# Patient Record
Sex: MALE | Race: WHITE | Employment: OTHER | URBAN - METROPOLITAN AREA
[De-identification: names, ages, dates, MRNs, and addresses within clinical notes are randomized per-mention and may not be internally consistent; named-entity substitution may affect disease eponyms.]

---

## 2019-02-06 ENCOUNTER — HOSPITAL ENCOUNTER (INPATIENT)
Age: 64
LOS: 1 days | Discharge: HOME | DRG: 392 | End: 2019-02-07
Attending: EMERGENCY MEDICINE | Admitting: HOSPITALIST
Payer: MEDICARE

## 2019-02-06 ENCOUNTER — APPOINTMENT (OUTPATIENT)
Dept: CT IMAGING | Age: 64
DRG: 392 | End: 2019-02-06
Payer: MEDICARE

## 2019-02-06 DIAGNOSIS — R10.84 GENERALIZED ABDOMINAL PAIN: Primary | ICD-10-CM

## 2019-02-06 DIAGNOSIS — S30.1XXA ABDOMINAL WALL SEROMA, INITIAL ENCOUNTER: ICD-10-CM

## 2019-02-06 DIAGNOSIS — K56.600 PARTIAL INTESTINAL OBSTRUCTION, UNSPECIFIED CAUSE (HCC): ICD-10-CM

## 2019-02-06 DIAGNOSIS — R11.2 NAUSEA AND VOMITING, INTRACTABILITY OF VOMITING NOT SPECIFIED, UNSPECIFIED VOMITING TYPE: ICD-10-CM

## 2019-02-06 DIAGNOSIS — R18.8 ABDOMINAL FLUID COLLECTION: ICD-10-CM

## 2019-02-06 LAB
ALBUMIN SERPL-MCNC: 4.2 GM/DL (ref 3.4–5)
ALP BLD-CCNC: 95 IU/L (ref 40–129)
ALT SERPL-CCNC: 62 U/L (ref 10–40)
ANION GAP SERPL CALCULATED.3IONS-SCNC: 12 MMOL/L (ref 4–16)
AST SERPL-CCNC: 48 IU/L (ref 15–37)
BACTERIA: NEGATIVE /HPF
BASOPHILS ABSOLUTE: 0.1 K/CU MM
BASOPHILS RELATIVE PERCENT: 1.2 % (ref 0–1)
BILIRUB SERPL-MCNC: 0.4 MG/DL (ref 0–1)
BILIRUBIN URINE: NEGATIVE MG/DL
BLOOD, URINE: NEGATIVE
BUN BLDV-MCNC: 16 MG/DL (ref 6–23)
CALCIUM SERPL-MCNC: 9.9 MG/DL (ref 8.3–10.6)
CHLORIDE BLD-SCNC: 99 MMOL/L (ref 99–110)
CLARITY: CLEAR
CO2: 29 MMOL/L (ref 21–32)
COLOR: YELLOW
CREAT SERPL-MCNC: 0.7 MG/DL (ref 0.9–1.3)
DIFFERENTIAL TYPE: ABNORMAL
EOSINOPHILS ABSOLUTE: 0.1 K/CU MM
EOSINOPHILS RELATIVE PERCENT: 2.1 % (ref 0–3)
GFR AFRICAN AMERICAN: >60 ML/MIN/1.73M2
GFR NON-AFRICAN AMERICAN: >60 ML/MIN/1.73M2
GLUCOSE BLD-MCNC: 163 MG/DL (ref 70–99)
GLUCOSE, URINE: 50 MG/DL
HCT VFR BLD CALC: 44.6 % (ref 42–52)
HEMOGLOBIN: 14.8 GM/DL (ref 13.5–18)
IMMATURE NEUTROPHIL %: 0.1 % (ref 0–0.43)
KETONES, URINE: ABNORMAL MG/DL
LEUKOCYTE ESTERASE, URINE: NEGATIVE
LIPASE: 21 IU/L (ref 13–60)
LYMPHOCYTES ABSOLUTE: 1.4 K/CU MM
LYMPHOCYTES RELATIVE PERCENT: 21.1 % (ref 24–44)
MCH RBC QN AUTO: 29.6 PG (ref 27–31)
MCHC RBC AUTO-ENTMCNC: 33.2 % (ref 32–36)
MCV RBC AUTO: 89.2 FL (ref 78–100)
MONOCYTES ABSOLUTE: 0.9 K/CU MM
MONOCYTES RELATIVE PERCENT: 12.8 % (ref 0–4)
MUCUS: ABNORMAL HPF
NITRITE URINE, QUANTITATIVE: NEGATIVE
NUCLEATED RBC %: 0 %
PDW BLD-RTO: 12.2 % (ref 11.7–14.9)
PH, URINE: 7 (ref 5–8)
PLATELET # BLD: 191 K/CU MM (ref 140–440)
PMV BLD AUTO: 9.2 FL (ref 7.5–11.1)
POTASSIUM SERPL-SCNC: 4.4 MMOL/L (ref 3.5–5.1)
PROTEIN UA: >500 MG/DL
RBC # BLD: 5 M/CU MM (ref 4.6–6.2)
RBC URINE: 2 /HPF (ref 0–3)
SEGMENTED NEUTROPHILS ABSOLUTE COUNT: 4.3 K/CU MM
SEGMENTED NEUTROPHILS RELATIVE PERCENT: 62.7 % (ref 36–66)
SODIUM BLD-SCNC: 140 MMOL/L (ref 135–145)
SPECIFIC GRAVITY UA: 1.02 (ref 1–1.03)
SQUAMOUS EPITHELIAL: <1 /HPF
TOTAL IMMATURE NEUTOROPHIL: 0.01 K/CU MM
TOTAL NUCLEATED RBC: 0 K/CU MM
TOTAL PROTEIN: 7.4 GM/DL (ref 6.4–8.2)
TRICHOMONAS: ABNORMAL /HPF
TROPONIN T: <0.01 NG/ML
UROBILINOGEN, URINE: NORMAL MG/DL (ref 0.2–1)
WBC # BLD: 6.8 K/CU MM (ref 4–10.5)
WBC UA: 1 /HPF (ref 0–2)

## 2019-02-06 PROCEDURE — 80053 COMPREHEN METABOLIC PANEL: CPT

## 2019-02-06 PROCEDURE — 74177 CT ABD & PELVIS W/CONTRAST: CPT

## 2019-02-06 PROCEDURE — 83690 ASSAY OF LIPASE: CPT

## 2019-02-06 PROCEDURE — 85025 COMPLETE CBC W/AUTO DIFF WBC: CPT

## 2019-02-06 PROCEDURE — 93005 ELECTROCARDIOGRAM TRACING: CPT | Performed by: EMERGENCY MEDICINE

## 2019-02-06 PROCEDURE — 81001 URINALYSIS AUTO W/SCOPE: CPT

## 2019-02-06 PROCEDURE — 84484 ASSAY OF TROPONIN QUANT: CPT

## 2019-02-06 PROCEDURE — 99285 EMERGENCY DEPT VISIT HI MDM: CPT

## 2019-02-06 PROCEDURE — 6360000004 HC RX CONTRAST MEDICATION: Performed by: EMERGENCY MEDICINE

## 2019-02-06 RX ORDER — ONDANSETRON 2 MG/ML
4 INJECTION INTRAMUSCULAR; INTRAVENOUS EVERY 30 MIN PRN
Status: DISCONTINUED | OUTPATIENT
Start: 2019-02-06 | End: 2019-02-07

## 2019-02-06 RX ADMIN — IOPAMIDOL 80 ML: 755 INJECTION, SOLUTION INTRAVENOUS at 23:47

## 2019-02-06 ASSESSMENT — PAIN SCALES - GENERAL: PAINLEVEL_OUTOF10: 10

## 2019-02-06 ASSESSMENT — PAIN DESCRIPTION - DESCRIPTORS: DESCRIPTORS: CONSTANT

## 2019-02-06 ASSESSMENT — PAIN DESCRIPTION - PAIN TYPE: TYPE: ACUTE PAIN

## 2019-02-06 ASSESSMENT — PAIN DESCRIPTION - LOCATION: LOCATION: ABDOMEN

## 2019-02-07 ENCOUNTER — APPOINTMENT (OUTPATIENT)
Dept: INTERVENTIONAL RADIOLOGY/VASCULAR | Age: 64
DRG: 392 | End: 2019-02-07
Payer: MEDICARE

## 2019-02-07 VITALS
RESPIRATION RATE: 16 BRPM | TEMPERATURE: 98.4 F | OXYGEN SATURATION: 93 % | HEIGHT: 73 IN | DIASTOLIC BLOOD PRESSURE: 90 MMHG | WEIGHT: 300 LBS | BODY MASS INDEX: 39.76 KG/M2 | HEART RATE: 74 BPM | SYSTOLIC BLOOD PRESSURE: 129 MMHG

## 2019-02-07 PROBLEM — K56.609 BOWEL OBSTRUCTION (HCC): Status: ACTIVE | Noted: 2019-02-07

## 2019-02-07 PROBLEM — K52.9 GASTROENTERITIS: Status: ACTIVE | Noted: 2019-02-07

## 2019-02-07 PROCEDURE — 6370000000 HC RX 637 (ALT 250 FOR IP): Performed by: HOSPITALIST

## 2019-02-07 PROCEDURE — 2500000003 HC RX 250 WO HCPCS: Performed by: HOSPITALIST

## 2019-02-07 PROCEDURE — 6360000002 HC RX W HCPCS: Performed by: EMERGENCY MEDICINE

## 2019-02-07 PROCEDURE — 0W9F30Z DRAINAGE OF ABDOMINAL WALL WITH DRAINAGE DEVICE, PERCUTANEOUS APPROACH: ICD-10-PCS | Performed by: RADIOLOGY

## 2019-02-07 PROCEDURE — 2709999900 HC NON-CHARGEABLE SUPPLY

## 2019-02-07 PROCEDURE — C1729 CATH, DRAINAGE: HCPCS

## 2019-02-07 PROCEDURE — 6360000002 HC RX W HCPCS: Performed by: HOSPITALIST

## 2019-02-07 PROCEDURE — 96365 THER/PROPH/DIAG IV INF INIT: CPT

## 2019-02-07 PROCEDURE — 93010 ELECTROCARDIOGRAM REPORT: CPT | Performed by: INTERNAL MEDICINE

## 2019-02-07 PROCEDURE — 2580000003 HC RX 258: Performed by: HOSPITALIST

## 2019-02-07 PROCEDURE — 0W9G3ZX DRAINAGE OF PERITONEAL CAVITY, PERCUTANEOUS APPROACH, DIAGNOSTIC: ICD-10-PCS | Performed by: RADIOLOGY

## 2019-02-07 PROCEDURE — 87071 CULTURE AEROBIC QUANT OTHER: CPT

## 2019-02-07 PROCEDURE — 1200000000 HC SEMI PRIVATE

## 2019-02-07 PROCEDURE — 94761 N-INVAS EAR/PLS OXIMETRY MLT: CPT

## 2019-02-07 PROCEDURE — 10030 IMG GID FLU COLL DRG SFT TIS: CPT

## 2019-02-07 PROCEDURE — 96375 TX/PRO/DX INJ NEW DRUG ADDON: CPT

## 2019-02-07 PROCEDURE — C1894 INTRO/SHEATH, NON-LASER: HCPCS

## 2019-02-07 PROCEDURE — 94640 AIRWAY INHALATION TREATMENT: CPT

## 2019-02-07 PROCEDURE — 87073 CULTURE BACTERIA ANAEROBIC: CPT

## 2019-02-07 PROCEDURE — C1769 GUIDE WIRE: HCPCS

## 2019-02-07 RX ORDER — ONDANSETRON 2 MG/ML
4 INJECTION INTRAMUSCULAR; INTRAVENOUS EVERY 6 HOURS PRN
Status: DISCONTINUED | OUTPATIENT
Start: 2019-02-07 | End: 2019-02-07

## 2019-02-07 RX ORDER — HYDROCHLOROTHIAZIDE 25 MG/1
25 TABLET ORAL DAILY
Status: DISCONTINUED | OUTPATIENT
Start: 2019-02-07 | End: 2019-02-07 | Stop reason: HOSPADM

## 2019-02-07 RX ORDER — SODIUM CHLORIDE 0.9 % (FLUSH) 0.9 %
10 SYRINGE (ML) INJECTION EVERY 12 HOURS SCHEDULED
Status: DISCONTINUED | OUTPATIENT
Start: 2019-02-07 | End: 2019-02-07 | Stop reason: HOSPADM

## 2019-02-07 RX ORDER — SODIUM CHLORIDE 9 MG/ML
INJECTION, SOLUTION INTRAVENOUS CONTINUOUS
Status: DISCONTINUED | OUTPATIENT
Start: 2019-02-07 | End: 2019-02-07 | Stop reason: HOSPADM

## 2019-02-07 RX ORDER — ASPIRIN 81 MG/1
81 TABLET, CHEWABLE ORAL DAILY
Status: DISCONTINUED | OUTPATIENT
Start: 2019-02-07 | End: 2019-02-07 | Stop reason: HOSPADM

## 2019-02-07 RX ORDER — MORPHINE SULFATE 4 MG/ML
2 INJECTION, SOLUTION INTRAMUSCULAR; INTRAVENOUS
Status: DISCONTINUED | OUTPATIENT
Start: 2019-02-07 | End: 2019-02-07 | Stop reason: HOSPADM

## 2019-02-07 RX ORDER — AMITRIPTYLINE HYDROCHLORIDE 25 MG/1
25 TABLET, FILM COATED ORAL NIGHTLY
Status: DISCONTINUED | OUTPATIENT
Start: 2019-02-07 | End: 2019-02-07 | Stop reason: HOSPADM

## 2019-02-07 RX ORDER — AMLODIPINE BESYLATE 10 MG/1
10 TABLET ORAL DAILY
Status: DISCONTINUED | OUTPATIENT
Start: 2019-02-07 | End: 2019-02-07 | Stop reason: HOSPADM

## 2019-02-07 RX ORDER — OXYCODONE HYDROCHLORIDE AND ACETAMINOPHEN 5; 325 MG/1; MG/1
1 TABLET ORAL EVERY 4 HOURS PRN
Status: DISCONTINUED | OUTPATIENT
Start: 2019-02-07 | End: 2019-02-07 | Stop reason: HOSPADM

## 2019-02-07 RX ORDER — SODIUM CHLORIDE 0.9 % (FLUSH) 0.9 %
10 SYRINGE (ML) INJECTION PRN
Status: DISCONTINUED | OUTPATIENT
Start: 2019-02-07 | End: 2019-02-07 | Stop reason: HOSPADM

## 2019-02-07 RX ORDER — MORPHINE SULFATE 4 MG/ML
6 INJECTION, SOLUTION INTRAMUSCULAR; INTRAVENOUS ONCE
Status: COMPLETED | OUTPATIENT
Start: 2019-02-07 | End: 2019-02-07

## 2019-02-07 RX ORDER — PRAVASTATIN SODIUM 40 MG
40 TABLET ORAL DAILY
Status: DISCONTINUED | OUTPATIENT
Start: 2019-02-07 | End: 2019-02-07 | Stop reason: HOSPADM

## 2019-02-07 RX ORDER — ONDANSETRON 2 MG/ML
4 INJECTION INTRAMUSCULAR; INTRAVENOUS EVERY 6 HOURS PRN
Status: DISCONTINUED | OUTPATIENT
Start: 2019-02-07 | End: 2019-02-07 | Stop reason: HOSPADM

## 2019-02-07 RX ADMIN — AMLODIPINE BESYLATE 10 MG: 10 TABLET ORAL at 11:37

## 2019-02-07 RX ADMIN — PRAVASTATIN SODIUM 40 MG: 40 TABLET ORAL at 11:37

## 2019-02-07 RX ADMIN — MORPHINE SULFATE 2 MG: 4 INJECTION INTRAVENOUS at 07:11

## 2019-02-07 RX ADMIN — MORPHINE SULFATE 2 MG: 4 INJECTION INTRAVENOUS at 02:50

## 2019-02-07 RX ADMIN — MORPHINE SULFATE 6 MG: 4 INJECTION INTRAVENOUS at 01:04

## 2019-02-07 RX ADMIN — GLYCOPYRROLATE AND FORMOTEROL FUMARATE 2 PUFF: 9; 4.8 AEROSOL, METERED RESPIRATORY (INHALATION) at 07:46

## 2019-02-07 RX ADMIN — TAZOBACTAM SODIUM AND PIPERACILLIN SODIUM 3.38 G: 375; 3 INJECTION, SOLUTION INTRAVENOUS at 01:04

## 2019-02-07 RX ADMIN — TAZOBACTAM SODIUM AND PIPERACILLIN SODIUM 3.38 G: 375; 3 INJECTION, SOLUTION INTRAVENOUS at 07:11

## 2019-02-07 RX ADMIN — HYDROCHLOROTHIAZIDE 25 MG: 25 TABLET ORAL at 11:37

## 2019-02-07 RX ADMIN — SODIUM CHLORIDE: 900 INJECTION INTRAVENOUS at 02:51

## 2019-02-07 RX ADMIN — ONDANSETRON 4 MG: 2 INJECTION INTRAMUSCULAR; INTRAVENOUS at 01:04

## 2019-02-07 ASSESSMENT — PAIN SCALES - GENERAL
PAINLEVEL_OUTOF10: 7

## 2019-02-11 LAB
CULTURE: NORMAL
Lab: NORMAL
REPORT STATUS: NORMAL
SPECIMEN: NORMAL

## 2019-02-19 LAB
EKG ATRIAL RATE: 77 BPM
EKG DIAGNOSIS: NORMAL
EKG P AXIS: 16 DEGREES
EKG P-R INTERVAL: 170 MS
EKG Q-T INTERVAL: 402 MS
EKG QRS DURATION: 112 MS
EKG QTC CALCULATION (BAZETT): 454 MS
EKG R AXIS: -38 DEGREES
EKG T AXIS: 34 DEGREES
EKG VENTRICULAR RATE: 77 BPM

## 2019-12-26 ENCOUNTER — HOSPITAL ENCOUNTER (OUTPATIENT)
Age: 64
Discharge: HOME | End: 2019-12-26
Payer: MEDICARE

## 2019-12-26 LAB
ANION GAP SERPL CALCULATED.3IONS-SCNC: 12 MMOL/L (ref 4–16)
APTT: 29 SECONDS (ref 25.1–37.1)
BASOPHILS ABSOLUTE: 0.1 K/CU MM
BASOPHILS RELATIVE PERCENT: 1.1 % (ref 0–1)
BUN BLDV-MCNC: 17 MG/DL (ref 6–23)
CALCIUM SERPL-MCNC: 9.9 MG/DL (ref 8.3–10.6)
CHLORIDE BLD-SCNC: 96 MMOL/L (ref 99–110)
CO2: 32 MMOL/L (ref 21–32)
CREAT SERPL-MCNC: 0.7 MG/DL (ref 0.9–1.3)
DIFFERENTIAL TYPE: ABNORMAL
EOSINOPHILS ABSOLUTE: 0.2 K/CU MM
EOSINOPHILS RELATIVE PERCENT: 2.3 % (ref 0–3)
GFR AFRICAN AMERICAN: >60 ML/MIN/1.73M2
GFR NON-AFRICAN AMERICAN: >60 ML/MIN/1.73M2
GLUCOSE BLD-MCNC: 130 MG/DL (ref 70–99)
HCT VFR BLD CALC: 50.8 % (ref 42–52)
HEMOGLOBIN: 16.4 GM/DL (ref 13.5–18)
IMMATURE NEUTROPHIL %: 0.8 % (ref 0–0.43)
INR BLD: 0.89 INDEX
LYMPHOCYTES ABSOLUTE: 1.5 K/CU MM
LYMPHOCYTES RELATIVE PERCENT: 20.6 % (ref 24–44)
MCH RBC QN AUTO: 29.4 PG (ref 27–31)
MCHC RBC AUTO-ENTMCNC: 32.3 % (ref 32–36)
MCV RBC AUTO: 91.2 FL (ref 78–100)
MONOCYTES ABSOLUTE: 0.9 K/CU MM
MONOCYTES RELATIVE PERCENT: 12.1 % (ref 0–4)
NUCLEATED RBC %: 0 %
PDW BLD-RTO: 12.2 % (ref 11.7–14.9)
PLATELET # BLD: 197 K/CU MM (ref 140–440)
PMV BLD AUTO: 9.4 FL (ref 7.5–11.1)
POTASSIUM SERPL-SCNC: 4.5 MMOL/L (ref 3.5–5.1)
PROTHROMBIN TIME: 10.8 SECONDS (ref 11.7–14.5)
RBC # BLD: 5.57 M/CU MM (ref 4.6–6.2)
SEGMENTED NEUTROPHILS ABSOLUTE COUNT: 4.7 K/CU MM
SEGMENTED NEUTROPHILS RELATIVE PERCENT: 63.1 % (ref 36–66)
SODIUM BLD-SCNC: 140 MMOL/L (ref 135–145)
TOTAL IMMATURE NEUTOROPHIL: 0.06 K/CU MM
TOTAL NUCLEATED RBC: 0 K/CU MM
WBC # BLD: 7.4 K/CU MM (ref 4–10.5)

## 2019-12-26 PROCEDURE — 85730 THROMBOPLASTIN TIME PARTIAL: CPT

## 2019-12-26 PROCEDURE — 36415 COLL VENOUS BLD VENIPUNCTURE: CPT

## 2019-12-26 PROCEDURE — 83036 HEMOGLOBIN GLYCOSYLATED A1C: CPT

## 2019-12-26 PROCEDURE — 85025 COMPLETE CBC W/AUTO DIFF WBC: CPT

## 2019-12-26 PROCEDURE — 85610 PROTHROMBIN TIME: CPT

## 2019-12-26 PROCEDURE — 80048 BASIC METABOLIC PNL TOTAL CA: CPT

## 2019-12-31 ENCOUNTER — HOSPITAL ENCOUNTER (OUTPATIENT)
Dept: CARDIAC CATH/INVASIVE PROCEDURES | Age: 64
Setting detail: OBSERVATION
Discharge: HOME | DRG: 247 | End: 2020-01-01
Attending: INTERNAL MEDICINE | Admitting: INTERNAL MEDICINE
Payer: MEDICARE

## 2019-12-31 PROBLEM — I25.10 CAD S/P PERCUTANEOUS CORONARY ANGIOPLASTY: Status: ACTIVE | Noted: 2019-12-31

## 2019-12-31 PROBLEM — I25.10 CORONARY ARTERIOSCLEROSIS AFTER PERCUTANEOUS TRANSLUMINAL CORONARY ANGIOPLASTY (PTCA): Status: ACTIVE | Noted: 2019-12-31

## 2019-12-31 PROBLEM — Z98.61 CAD S/P PERCUTANEOUS CORONARY ANGIOPLASTY: Status: ACTIVE | Noted: 2019-12-31

## 2019-12-31 PROBLEM — Z98.61 CORONARY ARTERIOSCLEROSIS AFTER PERCUTANEOUS TRANSLUMINAL CORONARY ANGIOPLASTY (PTCA): Status: ACTIVE | Noted: 2019-12-31

## 2019-12-31 LAB
ACTIVATED CLOTTING TIME, LOW RANGE: 155 SEC
ACTIVATED CLOTTING TIME, LOW RANGE: 241 SEC
ACTIVATED CLOTTING TIME, LOW RANGE: 349 SEC
BASE EXCESS MIXED: ABNORMAL (ref 0–1.2)
CARBON MONOXIDE, BLOOD: 2.2 % (ref 0–5)
CO2 CONTENT: 31 MMOL/L (ref 19–24)
COMMENT: ABNORMAL
ESTIMATED AVERAGE GLUCOSE: 169 MG/DL
GLUCOSE BLD-MCNC: 121 MG/DL (ref 70–99)
GLUCOSE BLD-MCNC: 124 MG/DL (ref 70–99)
GLUCOSE BLD-MCNC: 146 MG/DL (ref 70–99)
GLUCOSE BLD-MCNC: 157 MG/DL (ref 70–99)
HBA1C MFR BLD: 7.5 % (ref 4.2–6.3)
HCO3 ARTERIAL: 29.4 MMOL/L (ref 18–23)
METHEMOGLOBIN ARTERIAL: 1.3 %
O2 SATURATION: 85.6 % (ref 96–97)
PCO2 ARTERIAL: 52 MMHG (ref 32–45)
PH BLOOD: 7.36 (ref 7.34–7.45)
PO2 ARTERIAL: 50 MMHG (ref 75–100)

## 2019-12-31 PROCEDURE — 85347 COAGULATION TIME ACTIVATED: CPT

## 2019-12-31 PROCEDURE — G0378 HOSPITAL OBSERVATION PER HR: HCPCS

## 2019-12-31 PROCEDURE — 6360000002 HC RX W HCPCS

## 2019-12-31 PROCEDURE — 93460 R&L HRT ART/VENTRICLE ANGIO: CPT

## 2019-12-31 PROCEDURE — 6370000000 HC RX 637 (ALT 250 FOR IP): Performed by: INTERNAL MEDICINE

## 2019-12-31 PROCEDURE — C1887 CATHETER, GUIDING: HCPCS

## 2019-12-31 PROCEDURE — B2111ZZ FLUOROSCOPY OF MULTIPLE CORONARY ARTERIES USING LOW OSMOLAR CONTRAST: ICD-10-PCS | Performed by: INTERNAL MEDICINE

## 2019-12-31 PROCEDURE — 82803 BLOOD GASES ANY COMBINATION: CPT

## 2019-12-31 PROCEDURE — C1769 GUIDE WIRE: HCPCS

## 2019-12-31 PROCEDURE — 4A023N8 MEASUREMENT OF CARDIAC SAMPLING AND PRESSURE, BILATERAL, PERCUTANEOUS APPROACH: ICD-10-PCS | Performed by: INTERNAL MEDICINE

## 2019-12-31 PROCEDURE — 2580000003 HC RX 258: Performed by: INTERNAL MEDICINE

## 2019-12-31 PROCEDURE — 2580000003 HC RX 258

## 2019-12-31 PROCEDURE — C1751 CATH, INF, PER/CENT/MIDLINE: HCPCS

## 2019-12-31 PROCEDURE — 027034Z DILATION OF CORONARY ARTERY, ONE ARTERY WITH DRUG-ELUTING INTRALUMINAL DEVICE, PERCUTANEOUS APPROACH: ICD-10-PCS | Performed by: INTERNAL MEDICINE

## 2019-12-31 PROCEDURE — 2709999900 HC NON-CHARGEABLE SUPPLY

## 2019-12-31 PROCEDURE — C9600 PERC DRUG-EL COR STENT SING: HCPCS

## 2019-12-31 PROCEDURE — 6370000000 HC RX 637 (ALT 250 FOR IP)

## 2019-12-31 PROCEDURE — 2500000003 HC RX 250 WO HCPCS

## 2019-12-31 PROCEDURE — 76937 US GUIDE VASCULAR ACCESS: CPT

## 2019-12-31 PROCEDURE — 2140000000 HC CCU INTERMEDIATE R&B

## 2019-12-31 PROCEDURE — C1725 CATH, TRANSLUMIN NON-LASER: HCPCS

## 2019-12-31 PROCEDURE — C1874 STENT, COATED/COV W/DEL SYS: HCPCS

## 2019-12-31 PROCEDURE — 6360000004 HC RX CONTRAST MEDICATION

## 2019-12-31 PROCEDURE — 82962 GLUCOSE BLOOD TEST: CPT

## 2019-12-31 PROCEDURE — C1894 INTRO/SHEATH, NON-LASER: HCPCS

## 2019-12-31 RX ORDER — SODIUM CHLORIDE 9 MG/ML
INJECTION, SOLUTION INTRAVENOUS CONTINUOUS
Status: DISCONTINUED | OUTPATIENT
Start: 2019-12-31 | End: 2019-12-31

## 2019-12-31 RX ORDER — FUROSEMIDE 40 MG/1
40 TABLET ORAL 2 TIMES DAILY
COMMUNITY

## 2019-12-31 RX ORDER — ALBUTEROL SULFATE 90 UG/1
2 AEROSOL, METERED RESPIRATORY (INHALATION) EVERY 6 HOURS PRN
Status: DISCONTINUED | OUTPATIENT
Start: 2019-12-31 | End: 2020-01-01 | Stop reason: HOSPADM

## 2019-12-31 RX ORDER — DOXAZOSIN MESYLATE 1 MG/1
2 TABLET ORAL DAILY
Status: DISCONTINUED | OUTPATIENT
Start: 2019-12-31 | End: 2020-01-01 | Stop reason: HOSPADM

## 2019-12-31 RX ORDER — ROSUVASTATIN CALCIUM 10 MG/1
10 TABLET, COATED ORAL DAILY
COMMUNITY

## 2019-12-31 RX ORDER — FUROSEMIDE 40 MG/1
40 TABLET ORAL 2 TIMES DAILY
Status: DISCONTINUED | OUTPATIENT
Start: 2019-12-31 | End: 2020-01-01 | Stop reason: HOSPADM

## 2019-12-31 RX ORDER — CARVEDILOL 6.25 MG/1
6.25 TABLET ORAL 2 TIMES DAILY WITH MEALS
Status: DISCONTINUED | OUTPATIENT
Start: 2019-12-31 | End: 2020-01-01 | Stop reason: HOSPADM

## 2019-12-31 RX ORDER — LOSARTAN POTASSIUM 100 MG/1
100 TABLET ORAL DAILY
COMMUNITY

## 2019-12-31 RX ORDER — ASPIRIN 81 MG/1
81 TABLET, CHEWABLE ORAL DAILY
Status: DISCONTINUED | OUTPATIENT
Start: 2019-12-31 | End: 2019-12-31 | Stop reason: SDUPTHER

## 2019-12-31 RX ORDER — ASPIRIN 81 MG/1
81 TABLET, CHEWABLE ORAL DAILY
Status: DISCONTINUED | OUTPATIENT
Start: 2020-01-01 | End: 2020-01-01 | Stop reason: HOSPADM

## 2019-12-31 RX ORDER — NICOTINE POLACRILEX 4 MG
15 LOZENGE BUCCAL PRN
Status: DISCONTINUED | OUTPATIENT
Start: 2019-12-31 | End: 2020-01-01 | Stop reason: HOSPADM

## 2019-12-31 RX ORDER — TAMSULOSIN HYDROCHLORIDE 0.4 MG/1
0.4 CAPSULE ORAL DAILY
Status: DISCONTINUED | OUTPATIENT
Start: 2020-01-01 | End: 2020-01-01 | Stop reason: HOSPADM

## 2019-12-31 RX ORDER — OXYCODONE HYDROCHLORIDE AND ACETAMINOPHEN 5; 325 MG/1; MG/1
1 TABLET ORAL EVERY 4 HOURS PRN
Status: DISCONTINUED | OUTPATIENT
Start: 2019-12-31 | End: 2020-01-01 | Stop reason: HOSPADM

## 2019-12-31 RX ORDER — BUSPIRONE HYDROCHLORIDE 5 MG/1
5 TABLET ORAL 2 TIMES DAILY
COMMUNITY

## 2019-12-31 RX ORDER — DIPHENHYDRAMINE HCL 25 MG
25 TABLET ORAL ONCE
Status: COMPLETED | OUTPATIENT
Start: 2019-12-31 | End: 2019-12-31

## 2019-12-31 RX ORDER — CHLORTHALIDONE 25 MG/1
TABLET ORAL
COMMUNITY

## 2019-12-31 RX ORDER — ACETAMINOPHEN 325 MG/1
650 TABLET ORAL EVERY 4 HOURS PRN
Status: DISCONTINUED | OUTPATIENT
Start: 2019-12-31 | End: 2020-01-01 | Stop reason: HOSPADM

## 2019-12-31 RX ORDER — HYDROXYZINE HYDROCHLORIDE 25 MG/1
25 TABLET, FILM COATED ORAL 3 TIMES DAILY PRN
COMMUNITY

## 2019-12-31 RX ORDER — SODIUM CHLORIDE 0.9 % (FLUSH) 0.9 %
10 SYRINGE (ML) INJECTION EVERY 12 HOURS SCHEDULED
Status: DISCONTINUED | OUTPATIENT
Start: 2019-12-31 | End: 2020-01-01 | Stop reason: HOSPADM

## 2019-12-31 RX ORDER — DIAZEPAM 5 MG/1
5 TABLET ORAL ONCE
Status: COMPLETED | OUTPATIENT
Start: 2019-12-31 | End: 2019-12-31

## 2019-12-31 RX ORDER — ALBUTEROL SULFATE 90 UG/1
2 AEROSOL, METERED RESPIRATORY (INHALATION) EVERY 6 HOURS PRN
COMMUNITY

## 2019-12-31 RX ORDER — TERAZOSIN 5 MG/1
5 CAPSULE ORAL NIGHTLY
COMMUNITY

## 2019-12-31 RX ORDER — ATROPINE SULFATE 0.4 MG/ML
0.5 AMPUL (ML) INJECTION
Status: ACTIVE | OUTPATIENT
Start: 2019-12-31 | End: 2019-12-31

## 2019-12-31 RX ORDER — LOSARTAN POTASSIUM 100 MG/1
100 TABLET ORAL DAILY
Status: DISCONTINUED | OUTPATIENT
Start: 2019-12-31 | End: 2020-01-01 | Stop reason: HOSPADM

## 2019-12-31 RX ORDER — CLOPIDOGREL BISULFATE 75 MG/1
75 TABLET ORAL DAILY
Status: DISCONTINUED | OUTPATIENT
Start: 2020-01-01 | End: 2020-01-01 | Stop reason: HOSPADM

## 2019-12-31 RX ORDER — TAMSULOSIN HYDROCHLORIDE 0.4 MG/1
0.4 CAPSULE ORAL DAILY
COMMUNITY

## 2019-12-31 RX ORDER — DEXTROSE MONOHYDRATE 50 MG/ML
100 INJECTION, SOLUTION INTRAVENOUS PRN
Status: DISCONTINUED | OUTPATIENT
Start: 2019-12-31 | End: 2020-01-01 | Stop reason: HOSPADM

## 2019-12-31 RX ORDER — ROSUVASTATIN CALCIUM 5 MG/1
10 TABLET, COATED ORAL DAILY
Status: DISCONTINUED | OUTPATIENT
Start: 2019-12-31 | End: 2020-01-01 | Stop reason: HOSPADM

## 2019-12-31 RX ORDER — CARVEDILOL 6.25 MG/1
6.25 TABLET ORAL 2 TIMES DAILY WITH MEALS
COMMUNITY

## 2019-12-31 RX ORDER — SODIUM CHLORIDE 0.9 % (FLUSH) 0.9 %
10 SYRINGE (ML) INJECTION PRN
Status: DISCONTINUED | OUTPATIENT
Start: 2019-12-31 | End: 2020-01-01 | Stop reason: HOSPADM

## 2019-12-31 RX ORDER — BUPROPION HYDROCHLORIDE 150 MG/1
150 TABLET ORAL EVERY MORNING
Status: DISCONTINUED | OUTPATIENT
Start: 2019-12-31 | End: 2020-01-01 | Stop reason: HOSPADM

## 2019-12-31 RX ORDER — AMLODIPINE BESYLATE 5 MG/1
5 TABLET ORAL DAILY
Status: DISCONTINUED | OUTPATIENT
Start: 2019-12-31 | End: 2020-01-01 | Stop reason: HOSPADM

## 2019-12-31 RX ORDER — SODIUM CHLORIDE 9 MG/ML
INJECTION, SOLUTION INTRAVENOUS CONTINUOUS
Status: DISCONTINUED | OUTPATIENT
Start: 2019-12-31 | End: 2020-01-01 | Stop reason: HOSPADM

## 2019-12-31 RX ORDER — POTASSIUM CHLORIDE 20 MEQ/1
20 TABLET, EXTENDED RELEASE ORAL 2 TIMES DAILY
Status: DISCONTINUED | OUTPATIENT
Start: 2019-12-31 | End: 2020-01-01 | Stop reason: HOSPADM

## 2019-12-31 RX ORDER — DEXTROSE MONOHYDRATE 25 G/50ML
12.5 INJECTION, SOLUTION INTRAVENOUS PRN
Status: DISCONTINUED | OUTPATIENT
Start: 2019-12-31 | End: 2020-01-01 | Stop reason: HOSPADM

## 2019-12-31 RX ORDER — BUPROPION HYDROCHLORIDE 150 MG/1
150 TABLET ORAL EVERY MORNING
COMMUNITY

## 2019-12-31 RX ORDER — BUSPIRONE HYDROCHLORIDE 5 MG/1
5 TABLET ORAL 2 TIMES DAILY
Status: DISCONTINUED | OUTPATIENT
Start: 2019-12-31 | End: 2020-01-01 | Stop reason: HOSPADM

## 2019-12-31 RX ORDER — POTASSIUM CHLORIDE 20 MEQ/1
20 TABLET, EXTENDED RELEASE ORAL 2 TIMES DAILY
COMMUNITY

## 2019-12-31 RX ORDER — MORPHINE SULFATE 2 MG/ML
1 INJECTION, SOLUTION INTRAMUSCULAR; INTRAVENOUS
Status: ACTIVE | OUTPATIENT
Start: 2019-12-31 | End: 2019-12-31

## 2019-12-31 RX ADMIN — BUSPIRONE HYDROCHLORIDE 5 MG: 5 TABLET ORAL at 20:53

## 2019-12-31 RX ADMIN — OXYCODONE AND ACETAMINOPHEN 1 TABLET: 5; 325 TABLET ORAL at 19:13

## 2019-12-31 RX ADMIN — SODIUM CHLORIDE: 9 INJECTION, SOLUTION INTRAVENOUS at 09:06

## 2019-12-31 RX ADMIN — ROSUVASTATIN CALCIUM 10 MG: 5 TABLET, COATED ORAL at 20:53

## 2019-12-31 RX ADMIN — POTASSIUM CHLORIDE 20 MEQ: 1500 TABLET, EXTENDED RELEASE ORAL at 20:53

## 2019-12-31 RX ADMIN — FUROSEMIDE 40 MG: 40 TABLET ORAL at 18:00

## 2019-12-31 RX ADMIN — DIPHENHYDRAMINE HYDROCHLORIDE 25 MG: 25 TABLET ORAL at 09:06

## 2019-12-31 RX ADMIN — OXYCODONE AND ACETAMINOPHEN 1 TABLET: 5; 325 TABLET ORAL at 13:29

## 2019-12-31 RX ADMIN — SODIUM CHLORIDE: 9 INJECTION, SOLUTION INTRAVENOUS at 16:02

## 2019-12-31 RX ADMIN — CARVEDILOL 6.25 MG: 6.25 TABLET, FILM COATED ORAL at 18:00

## 2019-12-31 RX ADMIN — DIAZEPAM 5 MG: 5 TABLET ORAL at 09:06

## 2019-12-31 ASSESSMENT — PAIN DESCRIPTION - PAIN TYPE
TYPE: CHRONIC PAIN
TYPE: CHRONIC PAIN

## 2019-12-31 ASSESSMENT — PAIN DESCRIPTION - LOCATION: LOCATION: NECK

## 2019-12-31 ASSESSMENT — PAIN DESCRIPTION - DESCRIPTORS: DESCRIPTORS: ACHING

## 2019-12-31 ASSESSMENT — PAIN SCALES - GENERAL
PAINLEVEL_OUTOF10: 9
PAINLEVEL_OUTOF10: 8
PAINLEVEL_OUTOF10: 0
PAINLEVEL_OUTOF10: 6

## 2019-12-31 NOTE — CONSULTS
IV Consult complete. Holograam 22G pressure injectable PIV placed in left FA with USG. Patient tolerated well.

## 2019-12-31 NOTE — CONSULTS
Cancer Father         prostate     REVIEW OF SYSTEMS:  Review of System Done as noted above     PHYSICAL EXAM:      Vitals:    BP (!) 159/93   Pulse 86   Resp 22   Ht 6' 1\" (1.854 m)   Wt (!) 325 lb (147.4 kg)   SpO2 90%   BMI 42.88 kg/m²     CONSTITUTIONAL:  awake, alert, cooperative, appears stated age   EYES:  vision intact Fundoscopic Exam not performed   ENT:Normal  NECK:  Supple, No JVD. Thyroid Exam:Normal   LUNGS:  Has Vesicular Breath Sounds,   CARDIOVASCULAR:  Normal apical impulse, regular rate and rhythm, normal S1 and S2, no S3 or S4, and has no  murmur   ABDOMEN:  No scars, normal bowel sounds, soft, non-distended, non-tender, no masses palpated, no hepatolienomegaly  Musculoskeletal: Normal  Extremities: Normal, peripheral pulses normal, , has no edema   NEUROLOGIC:  Awake, alert, oriented to name, place and time. Cranial nerves II-XII are grossly intact. Motor is  intact. Sensory is intact. ,  and gait is normal.    DATA:    CBC: No results for input(s): WBC, HGB, PLT in the last 72 hours. CMP:No results for input(s): NA, K, CL, CO2, BUN, CREATININE, CALCIUM, PROT, LABALBU, BILITOT, ALKPHOS, AST, ALT in the last 72 hours. Invalid input(s): GLU  Lipids:   Lab Results   Component Value Date    CHOL 204 10/15/2012    HDL 50 10/15/2012    TRIG 162 10/15/2012     Glucose:   Recent Labs     12/31/19  0855   POCGLU 146*     Hemoglobin A1C:   Lab Results   Component Value Date    LABA1C 6.3 10/15/2012     Free T4: No results found for: T4FREE  Free T3: No results found for: FT3  TSH High Sensitivity: No results found for: TSHHS    No results found.     Scheduled Medicines   Medications:    amLODIPine  5 mg Oral Daily    aspirin  81 mg Oral Daily    buPROPion  150 mg Oral QAM    busPIRone  5 mg Oral BID    carvedilol  6.25 mg Oral BID WC    furosemide  40 mg Oral BID    losartan  100 mg Oral Daily    potassium chloride  20 mEq Oral BID    rosuvastatin  10 mg Oral Daily    tamsulosin 0.4 mg Oral Daily    doxazosin  2 mg Oral Daily    Tiotropium Bromide-Olodaterol  1 puff Inhalation Daily    sodium chloride flush  10 mL Intravenous 2 times per day    [START ON 1/1/2020] aspirin  81 mg Oral Daily    [START ON 1/1/2020] clopidogrel  75 mg Oral Daily    insulin lispro  0-6 Units Subcutaneous TID WC    insulin lispro  0-3 Units Subcutaneous 2 times per day      Infusions:    sodium chloride      sodium chloride      dextrose           IMPRESSION    Patient Active Problem List   Diagnosis    Centrilobular emphysema (HCC)    History of colonic polyps    FH: colon cancer    Gastroenteritis    CAD S/P percutaneous coronary angioplasty    Coronary arteriosclerosis after percutaneous transluminal coronary angioplasty (PTCA)       Circumflex and RCA arteries were 80% blocked// stent was placed in both arteries      RECOMMENDATIONS:      1. Reviewed POC blood glucose . Labs and X ray results   2. Reviewed Home and Current Medicines   3. Will Start On Correction bolus Humalog nsulin regime    4. Monitor Blood glucose frequently   5. Modify  the dose of Insulin  as needed        Will follow with you  Again thank you for sharing pt's care with me.      Truly yours,       Albino Hendrix MD

## 2020-01-01 VITALS
DIASTOLIC BLOOD PRESSURE: 80 MMHG | TEMPERATURE: 97.6 F | OXYGEN SATURATION: 94 % | HEART RATE: 92 BPM | BODY MASS INDEX: 41.75 KG/M2 | WEIGHT: 315 LBS | HEIGHT: 73 IN | RESPIRATION RATE: 21 BRPM | SYSTOLIC BLOOD PRESSURE: 149 MMHG

## 2020-01-01 LAB
ANION GAP SERPL CALCULATED.3IONS-SCNC: 10 MMOL/L (ref 4–16)
BUN BLDV-MCNC: 15 MG/DL (ref 6–23)
CALCIUM SERPL-MCNC: 9.1 MG/DL (ref 8.3–10.6)
CHLORIDE BLD-SCNC: 99 MMOL/L (ref 99–110)
CO2: 31 MMOL/L (ref 21–32)
CREAT SERPL-MCNC: 0.7 MG/DL (ref 0.9–1.3)
GFR AFRICAN AMERICAN: >60 ML/MIN/1.73M2
GFR NON-AFRICAN AMERICAN: >60 ML/MIN/1.73M2
GLUCOSE BLD-MCNC: 124 MG/DL (ref 70–99)
GLUCOSE BLD-MCNC: 136 MG/DL (ref 70–99)
HCT VFR BLD CALC: 51.3 % (ref 42–52)
HEMOGLOBIN: 16.2 GM/DL (ref 13.5–18)
MCH RBC QN AUTO: 29.2 PG (ref 27–31)
MCHC RBC AUTO-ENTMCNC: 31.6 % (ref 32–36)
MCV RBC AUTO: 92.6 FL (ref 78–100)
PDW BLD-RTO: 12.2 % (ref 11.7–14.9)
PLATELET # BLD: 195 K/CU MM (ref 140–440)
PMV BLD AUTO: 9.1 FL (ref 7.5–11.1)
POTASSIUM SERPL-SCNC: 4.4 MMOL/L (ref 3.5–5.1)
RBC # BLD: 5.54 M/CU MM (ref 4.6–6.2)
SODIUM BLD-SCNC: 140 MMOL/L (ref 135–145)
WBC # BLD: 6.4 K/CU MM (ref 4–10.5)

## 2020-01-01 PROCEDURE — 6370000000 HC RX 637 (ALT 250 FOR IP): Performed by: INTERNAL MEDICINE

## 2020-01-01 PROCEDURE — 85027 COMPLETE CBC AUTOMATED: CPT

## 2020-01-01 PROCEDURE — 36415 COLL VENOUS BLD VENIPUNCTURE: CPT

## 2020-01-01 PROCEDURE — G0378 HOSPITAL OBSERVATION PER HR: HCPCS

## 2020-01-01 PROCEDURE — 82962 GLUCOSE BLOOD TEST: CPT

## 2020-01-01 PROCEDURE — 80048 BASIC METABOLIC PNL TOTAL CA: CPT

## 2020-01-01 PROCEDURE — 94762 N-INVAS EAR/PLS OXIMTRY CONT: CPT

## 2020-01-01 RX ORDER — CLOPIDOGREL BISULFATE 75 MG/1
75 TABLET ORAL DAILY
Qty: 30 TABLET | Refills: 3 | Status: SHIPPED | OUTPATIENT
Start: 2020-01-02

## 2020-01-01 RX ADMIN — POTASSIUM CHLORIDE 20 MEQ: 1500 TABLET, EXTENDED RELEASE ORAL at 09:04

## 2020-01-01 RX ADMIN — CLOPIDOGREL BISULFATE 75 MG: 75 TABLET ORAL at 09:03

## 2020-01-01 RX ADMIN — DOXAZOSIN 2 MG: 1 TABLET ORAL at 09:02

## 2020-01-01 RX ADMIN — CARVEDILOL 6.25 MG: 6.25 TABLET, FILM COATED ORAL at 09:03

## 2020-01-01 RX ADMIN — OXYCODONE AND ACETAMINOPHEN 1 TABLET: 5; 325 TABLET ORAL at 00:57

## 2020-01-01 RX ADMIN — LOSARTAN POTASSIUM 100 MG: 100 TABLET, FILM COATED ORAL at 09:03

## 2020-01-01 RX ADMIN — TAMSULOSIN HYDROCHLORIDE 0.4 MG: 0.4 CAPSULE ORAL at 09:03

## 2020-01-01 RX ADMIN — AMLODIPINE BESYLATE 5 MG: 5 TABLET ORAL at 09:03

## 2020-01-01 RX ADMIN — FUROSEMIDE 40 MG: 40 TABLET ORAL at 09:03

## 2020-01-01 RX ADMIN — BUSPIRONE HYDROCHLORIDE 5 MG: 5 TABLET ORAL at 09:03

## 2020-01-01 RX ADMIN — ASPIRIN 81 MG 81 MG: 81 TABLET ORAL at 09:03

## 2020-01-01 RX ADMIN — BUPROPION HYDROCHLORIDE 150 MG: 150 TABLET, FILM COATED, EXTENDED RELEASE ORAL at 09:03

## 2020-01-01 RX ADMIN — ROSUVASTATIN CALCIUM 10 MG: 5 TABLET, COATED ORAL at 09:04

## 2020-01-01 RX ADMIN — OXYCODONE AND ACETAMINOPHEN 1 TABLET: 5; 325 TABLET ORAL at 09:00

## 2020-01-01 ASSESSMENT — PAIN DESCRIPTION - LOCATION
LOCATION: NECK
LOCATION: NECK

## 2020-01-01 ASSESSMENT — PAIN DESCRIPTION - PAIN TYPE
TYPE: CHRONIC PAIN
TYPE: CHRONIC PAIN

## 2020-01-01 ASSESSMENT — PAIN DESCRIPTION - DESCRIPTORS: DESCRIPTORS: ACHING

## 2020-01-01 ASSESSMENT — PAIN DESCRIPTION - FREQUENCY: FREQUENCY: CONTINUOUS

## 2020-01-01 ASSESSMENT — PAIN SCALES - GENERAL
PAINLEVEL_OUTOF10: 9
PAINLEVEL_OUTOF10: 9
PAINLEVEL_OUTOF10: 0
PAINLEVEL_OUTOF10: 3

## 2020-01-01 NOTE — PLAN OF CARE
Problem: Pain:  Goal: Pain level will decrease  Description  Pain level will decrease  1/1/2020 0138 by Carlos Machuca RN  Outcome: Ongoing  12/31/2019 1334 by Gabino Redd RN  Outcome: Ongoing  Goal: Control of acute pain  Description  Control of acute pain  1/1/2020 0138 by Carlos Machuca RN  Outcome: Ongoing  12/31/2019 1334 by Gabino Redd RN  Outcome: Ongoing  Goal: Control of chronic pain  Description  Control of chronic pain  1/1/2020 0138 by Carlos Machuca RN  Outcome: Ongoing  12/31/2019 1334 by Gabino Redd RN  Outcome: Ongoing

## 2020-01-01 NOTE — CONSULTS
[address], [address] [facility]                                  CONSULTATION    PATIENT NAME: Óscar Murphy                      :        1955  MED REC NO:   [id number]                          ROOM:       1348  ACCOUNT NO:   [de-identified]                           ADMIT DATE: 2019  PROVIDER:     Erasmo Moyer MD    CONSULT DATE:  2020    HISTORY OF PRESENT ILLNESS:  The patient is a 53-year-old gentleman with  multiple medical problems including COPD, diabetes, hypertension,  hyperlipidemia, who was admitted through the emergency room with  complaints of swelling of the lower extremities. He is also complaining  of shortness of breath on exertion. He does complain of excessive  daytime somnolence, snoring and sleep fragmentation. He had an  ApneaLink which was suggestive of moderate obstructive sleep apnea with  AHI of 25. The patient has home oxygen. PAST MEDICAL HISTORY:  Significant for COPD; arthritis; hypertension;  hyperlipidemia; chronic respiratory failure, on home oxygen. PAST SURGICAL HISTORY:  Remarkable for colonoscopy, cholecystectomy,  cardiac catheterization, shoulder surgery and joint replacement surgery,  and tonsillectomy. FAMILY HISTORY:  Reveals that his mother had heart disease and  hypertension. Father had prostate cancer. SOCIAL HISTORY:  Reveals that he quit smoking several years ago, but  used to smoke a pack per day. He quit smoking few years ago, but he  used to smoke a pack per day for 50 years prior to that. He drinks  alcohol off and on. No history of drug abuse. MEDICATIONS:  Reviewed. ALLERGIES:  He has no known allergies. REVIEW OF SYSTEMS:  A 10- to 14-point review of systems were reviewed  and are negative except for what is mentioned in the history of present  illness.     PHYSICAL EXAMINATION:  GENERAL:  The patient is alert and oriented x3, in no acute respiratory  distress. VITAL SIGNS:  His blood pressure was 149/80 mmHg, pulse of 92 per  minute, and respiratory rate of 21 per minute. He is afebrile. His  saturation is 94% on 2 liters nasal cannula. HEENT:  Essentially unremarkable. NECK:  There is no JVD. No lymphadenopathy. The neck is supple. The  Mallampati score is 4. LUNGS:  Revealed diminished breath sounds. There are no rhonchi. HEART:  Showed normal S1 and S2. There was no S3 or S4 noted. ABDOMEN:  Benign. There is no evidence of any organomegaly. The bowel  sounds are present. NEUROLOGIC:  Reveals that he is awake and responsive, answering  questions appropriately, moving his extremities. LABORATORY DATA:  His ApneaLink showed evidence of moderate obstructive  sleep apnea with AHI of 25. His electrolytes were unremarkable. His  CBC showed a white count of 6.4, hemoglobin of 16.2, and hematocrit of  51.3. IMPRESSION:  1. Excessive daytime somnolence, snoring, ApneaLink with AHI of 25 most  likely the patient has obstructive sleep apnea. 2.  COPD with chronic respiratory failure, on home oxygen. 3.  History of tobacco abuse. PLAN:  1. Discussed with the patient in detail. The patient will need  outpatient sleep study and we will arrange CPAP after the sleep study. 2.  Continue Bevespi two puffs twice a day. 3.  We will get PFTs as outpatient. 4.  We will follow the patient in the office. 5.  Discussed with the patient in detail the importance of getting sleep  study and CPAP. 6.  As per orders.         Dilma Randle MD    D: 01/01/2020 10:33:00       T: 01/01/2020 10:59:42     /[email or web address]  Job#: [id number]     Doc#: [id number]    CC:

## 2020-01-02 NOTE — PROGRESS NOTES
1/1/2020 6:48 AM  Patient Room #: 1666/9718-I  Patient Name: Fredy Estrella    (Step 1 Done by RN if possible otherwise call Pulmonary Diagnostics)  1. Place patient on room air at rest for at least 30 minutes. If patient falls below 88% before 30 minutes then you can record the level and stop. Record room air saturation level __ %. If patient is at 88% or below, they will qualify for home oxygen and you can stop. If level does not fall below 88%, fill in level above. If indicated continue to Step 2. Signature:_____ Date: ___  (Step 2&3 Done by [facility])  2. Ambulate patient on room air until saturation falls below 89%. Record level of room air saturation with ambulation___ %. Next, place patient back on ___lpm oxygen and ambulate, record level __%. (Note:  this level must show improvement from room air level done with ambulation.)  If patients saturation on room air with ambulation is 88% or below AND patient shows improvement with oxygen during ambulation, they will qualify for home oxygen and you can stop. If patient does not drop below 89%, then patient should have an overnight oximetry trending on room air to see if level falls below 88%. Complete level in Step 3 below. 3. Room air overnight oximetry level 88 % for___  cumulative minutes. If patients room air oxygen level is < 89% for at least 5 cumulative minutes, patient will qualify for home oxygen and you can stop. (Attach Night Trending Report)    Complete order below: Diagnosis:__________  Home oxygen at:  Length of Need: ? Lifetime ?  3 Months     ___lpm or __%   via  [] nasal cannula  []mask  [] other:___         []continuous []  with activity  []  Nocturnal   [] Portable Tanks []  Concentrator        Therapist Signature:____________     Date:  _____  Physician Signature:  __Electronically Signed in EMR_    Date: ____    Physician Printed Name:  _________   NPI # _______    [] Patient Qualifies      [] Patient Does NOT
Home today  Await pulmonary input  He has oxygen at home already  He needs CPAP
Patient in room @ 1215hr  Site assessment: Right radial: TR band 11mL in chamber, small hematoma that is no currently soft after pressure held, no bleeding. Right Brachial vein: 5Fr sheath sutured in place with fluids to side port, sheath sutured in place. Fluids running, no bleeding, no hematoma. Assessed by: Umm Suman, RN and Delphine Fleischer, RN  Call light within reach, family at bedside, patient on monitor.
normal.    Assessment:     Patient Active Problem List:     Centrilobular emphysema ([address]     History of colonic polyps     FH: colon cancer     Gastroenteritis     CAD S/P percutaneous coronary angioplasty     Coronary arteriosclerosis after percutaneous transluminal coronary angioplasty (PTCA)     Diabetes mellitus     Morbid obesity    Plan:     1. Reviewed POC blood glucose . Labs and X ray results   2. Reviewed Current Medicines   3. On Correction bolus Humalog/ Basal Lantus Insulin regime /  4. Monitor Blood glucose frequently   5. Modified  the dose of Insulin/ other medicines as needed   6. Will follow     .      Annmarie Andujar MD

## 2020-01-02 NOTE — DISCHARGE SUMMARY
[address] [facility]               [address] [facility], [address] [facility]                               DISCHARGE SUMMARY    PATIENT NAME: Tomi Willis                      :        1955  MED REC NO:   [id number]                          ROOM:       4401  ACCOUNT NO:   [de-identified]                           ADMIT DATE: 2019  PROVIDER:     Jasiel Major MD                  DISCHARGE DATE:  2020    FINAL DIAGNOSES:  Coronary artery disease status post angioplasty done,  history of having pulmonary hypertension, history of having sleep apnea,  history of hypoxia, and lower extremity edema present. HOSPITAL COURSE:  This is a 68-year-old male patient who came to the  hospital yesterday, underwent heart cath. Left main is patent. LAD has  mild disease. Circ had 80% stenosis which was stented. RCA had 90%  stenosis and is calcified vessel. The plan is to bring him back for  angioplasty of the RCA in few weeks and severe pulmonary hypertension  noted. Right pressures were in 60s present. The patient is also  hypoxic and ApneaLink was performed. ApneaLink with significant hypoxia  noted and significantly abnormal.  Index was 25.  _____ index was 28. The patient is seen by Dr. Johnella Gowers. The plan is to discharge home  today. The patient will follow up as outpatient with Dr. Johnella Gowers for  sleep study. He has oxygen at home and probably use it more often. His BUN and creatinine stable 15 and 0.7. CBC is normal.    The patient will be discharged home in stable condition. Cardiac diet. Activity per post cath. Follow up in the office next week. DISCHARGE MEDICATIONS:  The patient will be discharged home on Plavix 75  mg once a day and we are going to stop his hydrochlorothiazide.    Continue his other medications of inhalers, amlodipine 10 mg once a day,  aspirin, bupropion, Coreg 3.125 mg b.i.d., Crestor 40 mg once a day,  Nexium 40 mg

## 2020-01-15 ENCOUNTER — HOSPITAL ENCOUNTER (INPATIENT)
Dept: CARDIAC CATH/INVASIVE PROCEDURES | Age: 65
LOS: 1 days | Discharge: HOME | DRG: 247 | End: 2020-01-16
Attending: INTERNAL MEDICINE | Admitting: INTERNAL MEDICINE
Payer: MEDICARE

## 2020-01-15 PROBLEM — Z98.62 S/P ANGIOPLASTY: Status: ACTIVE | Noted: 2020-01-15

## 2020-01-15 LAB
ACTIVATED CLOTTING TIME, LOW RANGE: 240 SEC
ACTIVATED CLOTTING TIME, LOW RANGE: 271 SEC
ACTIVATED CLOTTING TIME, LOW RANGE: 284 SEC
ACTIVATED CLOTTING TIME, LOW RANGE: 322 SEC
GLUCOSE BLD-MCNC: 159 MG/DL (ref 70–99)

## 2020-01-15 PROCEDURE — 2140000000 HC CCU INTERMEDIATE R&B

## 2020-01-15 PROCEDURE — C1894 INTRO/SHEATH, NON-LASER: HCPCS

## 2020-01-15 PROCEDURE — C1874 STENT, COATED/COV W/DEL SYS: HCPCS

## 2020-01-15 PROCEDURE — 6370000000 HC RX 637 (ALT 250 FOR IP): Performed by: INTERNAL MEDICINE

## 2020-01-15 PROCEDURE — 2580000003 HC RX 258: Performed by: INTERNAL MEDICINE

## 2020-01-15 PROCEDURE — C9602 PERC D-E COR STENT ATHER S: HCPCS

## 2020-01-15 PROCEDURE — 6360000004 HC RX CONTRAST MEDICATION

## 2020-01-15 PROCEDURE — 94761 N-INVAS EAR/PLS OXIMETRY MLT: CPT

## 2020-01-15 PROCEDURE — 85347 COAGULATION TIME ACTIVATED: CPT

## 2020-01-15 PROCEDURE — C1724 CATH, TRANS ATHEREC,ROTATION: HCPCS

## 2020-01-15 PROCEDURE — C1769 GUIDE WIRE: HCPCS

## 2020-01-15 PROCEDURE — C1725 CATH, TRANSLUMIN NON-LASER: HCPCS

## 2020-01-15 PROCEDURE — 02C03ZZ EXTIRPATION OF MATTER FROM CORONARY ARTERY, ONE ARTERY, PERCUTANEOUS APPROACH: ICD-10-PCS | Performed by: INTERNAL MEDICINE

## 2020-01-15 PROCEDURE — 2580000003 HC RX 258

## 2020-01-15 PROCEDURE — 82962 GLUCOSE BLOOD TEST: CPT

## 2020-01-15 PROCEDURE — 027035Z DILATION OF CORONARY ARTERY, ONE ARTERY WITH TWO DRUG-ELUTING INTRALUMINAL DEVICES, PERCUTANEOUS APPROACH: ICD-10-PCS | Performed by: INTERNAL MEDICINE

## 2020-01-15 PROCEDURE — 6370000000 HC RX 637 (ALT 250 FOR IP)

## 2020-01-15 PROCEDURE — C1887 CATHETER, GUIDING: HCPCS

## 2020-01-15 PROCEDURE — B2151ZZ FLUOROSCOPY OF LEFT HEART USING LOW OSMOLAR CONTRAST: ICD-10-PCS | Performed by: INTERNAL MEDICINE

## 2020-01-15 PROCEDURE — 94640 AIRWAY INHALATION TREATMENT: CPT

## 2020-01-15 PROCEDURE — 2720000010 HC SURG SUPPLY STERILE

## 2020-01-15 PROCEDURE — B2101ZZ FLUOROSCOPY OF SINGLE CORONARY ARTERY USING LOW OSMOLAR CONTRAST: ICD-10-PCS | Performed by: INTERNAL MEDICINE

## 2020-01-15 PROCEDURE — 2709999900 HC NON-CHARGEABLE SUPPLY

## 2020-01-15 PROCEDURE — 6360000002 HC RX W HCPCS

## 2020-01-15 RX ORDER — LOSARTAN POTASSIUM 100 MG/1
100 TABLET ORAL DAILY
Status: DISCONTINUED | OUTPATIENT
Start: 2020-01-16 | End: 2020-01-16 | Stop reason: HOSPADM

## 2020-01-15 RX ORDER — CLOPIDOGREL BISULFATE 75 MG/1
75 TABLET ORAL DAILY
Status: DISCONTINUED | OUTPATIENT
Start: 2020-01-16 | End: 2020-01-16 | Stop reason: HOSPADM

## 2020-01-15 RX ORDER — ATROPINE SULFATE 0.1 MG/ML
0.5 INJECTION INTRAVENOUS
Status: ACTIVE | OUTPATIENT
Start: 2020-01-15 | End: 2020-01-15

## 2020-01-15 RX ORDER — OXYCODONE HYDROCHLORIDE AND ACETAMINOPHEN 5; 325 MG/1; MG/1
1 TABLET ORAL EVERY 4 HOURS PRN
Status: DISCONTINUED | OUTPATIENT
Start: 2020-01-15 | End: 2020-01-16 | Stop reason: HOSPADM

## 2020-01-15 RX ORDER — TAMSULOSIN HYDROCHLORIDE 0.4 MG/1
0.4 CAPSULE ORAL DAILY
Status: DISCONTINUED | OUTPATIENT
Start: 2020-01-15 | End: 2020-01-16 | Stop reason: HOSPADM

## 2020-01-15 RX ORDER — ACETAMINOPHEN 325 MG/1
650 TABLET ORAL EVERY 4 HOURS PRN
Status: DISCONTINUED | OUTPATIENT
Start: 2020-01-15 | End: 2020-01-16 | Stop reason: HOSPADM

## 2020-01-15 RX ORDER — SODIUM CHLORIDE 9 MG/ML
INJECTION, SOLUTION INTRAVENOUS CONTINUOUS
Status: DISCONTINUED | OUTPATIENT
Start: 2020-01-15 | End: 2020-01-16 | Stop reason: HOSPADM

## 2020-01-15 RX ORDER — AMLODIPINE BESYLATE 10 MG/1
10 TABLET ORAL DAILY
Status: DISCONTINUED | OUTPATIENT
Start: 2020-01-16 | End: 2020-01-16 | Stop reason: HOSPADM

## 2020-01-15 RX ORDER — CARVEDILOL 6.25 MG/1
6.25 TABLET ORAL 2 TIMES DAILY WITH MEALS
Status: DISCONTINUED | OUTPATIENT
Start: 2020-01-15 | End: 2020-01-16 | Stop reason: HOSPADM

## 2020-01-15 RX ORDER — BUSPIRONE HYDROCHLORIDE 5 MG/1
5 TABLET ORAL 2 TIMES DAILY
Status: DISCONTINUED | OUTPATIENT
Start: 2020-01-15 | End: 2020-01-16 | Stop reason: HOSPADM

## 2020-01-15 RX ORDER — HYDROXYZINE HYDROCHLORIDE 25 MG/1
25 TABLET, FILM COATED ORAL 3 TIMES DAILY PRN
Status: DISCONTINUED | OUTPATIENT
Start: 2020-01-15 | End: 2020-01-16 | Stop reason: HOSPADM

## 2020-01-15 RX ORDER — MORPHINE SULFATE 2 MG/ML
1 INJECTION, SOLUTION INTRAMUSCULAR; INTRAVENOUS
Status: ACTIVE | OUTPATIENT
Start: 2020-01-15 | End: 2020-01-15

## 2020-01-15 RX ORDER — DIAZEPAM 5 MG/1
5 TABLET ORAL ONCE
Status: COMPLETED | OUTPATIENT
Start: 2020-01-15 | End: 2020-01-15

## 2020-01-15 RX ORDER — DIPHENHYDRAMINE HCL 25 MG
25 TABLET ORAL ONCE
Status: COMPLETED | OUTPATIENT
Start: 2020-01-15 | End: 2020-01-15

## 2020-01-15 RX ORDER — ATROPINE SULFATE 0.4 MG/ML
0.5 AMPUL (ML) INJECTION
Status: DISCONTINUED | OUTPATIENT
Start: 2020-01-15 | End: 2020-01-15 | Stop reason: SDUPTHER

## 2020-01-15 RX ORDER — SODIUM CHLORIDE 0.9 % (FLUSH) 0.9 %
10 SYRINGE (ML) INJECTION PRN
Status: DISCONTINUED | OUTPATIENT
Start: 2020-01-15 | End: 2020-01-16 | Stop reason: HOSPADM

## 2020-01-15 RX ORDER — BUPROPION HYDROCHLORIDE 150 MG/1
150 TABLET ORAL EVERY MORNING
Status: DISCONTINUED | OUTPATIENT
Start: 2020-01-16 | End: 2020-01-16 | Stop reason: HOSPADM

## 2020-01-15 RX ORDER — CLOPIDOGREL BISULFATE 75 MG/1
75 TABLET ORAL DAILY
Status: DISCONTINUED | OUTPATIENT
Start: 2020-01-16 | End: 2020-01-15 | Stop reason: SDUPTHER

## 2020-01-15 RX ORDER — FUROSEMIDE 40 MG/1
40 TABLET ORAL 2 TIMES DAILY
Status: DISCONTINUED | OUTPATIENT
Start: 2020-01-15 | End: 2020-01-16 | Stop reason: HOSPADM

## 2020-01-15 RX ORDER — ALBUTEROL SULFATE 90 UG/1
2 AEROSOL, METERED RESPIRATORY (INHALATION) EVERY 6 HOURS PRN
Status: DISCONTINUED | OUTPATIENT
Start: 2020-01-15 | End: 2020-01-16 | Stop reason: HOSPADM

## 2020-01-15 RX ORDER — ROSUVASTATIN CALCIUM 5 MG/1
10 TABLET, COATED ORAL DAILY
Status: DISCONTINUED | OUTPATIENT
Start: 2020-01-16 | End: 2020-01-16 | Stop reason: HOSPADM

## 2020-01-15 RX ORDER — ASPIRIN 81 MG/1
81 TABLET, CHEWABLE ORAL DAILY
Status: DISCONTINUED | OUTPATIENT
Start: 2020-01-16 | End: 2020-01-16 | Stop reason: HOSPADM

## 2020-01-15 RX ORDER — SODIUM CHLORIDE 0.9 % (FLUSH) 0.9 %
10 SYRINGE (ML) INJECTION EVERY 12 HOURS SCHEDULED
Status: DISCONTINUED | OUTPATIENT
Start: 2020-01-15 | End: 2020-01-16 | Stop reason: HOSPADM

## 2020-01-15 RX ORDER — POTASSIUM CHLORIDE 20 MEQ/1
20 TABLET, EXTENDED RELEASE ORAL 2 TIMES DAILY
Status: DISCONTINUED | OUTPATIENT
Start: 2020-01-15 | End: 2020-01-16 | Stop reason: HOSPADM

## 2020-01-15 RX ADMIN — SODIUM CHLORIDE: 9 INJECTION, SOLUTION INTRAVENOUS at 15:13

## 2020-01-15 RX ADMIN — BUSPIRONE HYDROCHLORIDE 5 MG: 5 TABLET ORAL at 20:50

## 2020-01-15 RX ADMIN — SODIUM CHLORIDE: 9 INJECTION, SOLUTION INTRAVENOUS at 09:06

## 2020-01-15 RX ADMIN — OXYCODONE HYDROCHLORIDE AND ACETAMINOPHEN 1 TABLET: 5; 325 TABLET ORAL at 19:26

## 2020-01-15 RX ADMIN — FUROSEMIDE 40 MG: 40 TABLET ORAL at 16:41

## 2020-01-15 RX ADMIN — OXYCODONE HYDROCHLORIDE AND ACETAMINOPHEN 1 TABLET: 5; 325 TABLET ORAL at 23:30

## 2020-01-15 RX ADMIN — POTASSIUM CHLORIDE 20 MEQ: 1500 TABLET, EXTENDED RELEASE ORAL at 20:50

## 2020-01-15 RX ADMIN — CARVEDILOL 6.25 MG: 6.25 TABLET, FILM COATED ORAL at 16:41

## 2020-01-15 RX ADMIN — SODIUM CHLORIDE: 9 INJECTION, SOLUTION INTRAVENOUS at 20:50

## 2020-01-15 RX ADMIN — GLYCOPYRROLATE AND FORMOTEROL FUMARATE 2 PUFF: 9; 4.8 AEROSOL, METERED RESPIRATORY (INHALATION) at 22:40

## 2020-01-15 RX ADMIN — DIAZEPAM 5 MG: 5 TABLET ORAL at 09:06

## 2020-01-15 RX ADMIN — DIPHENHYDRAMINE HYDROCHLORIDE 25 MG: 25 TABLET ORAL at 09:06

## 2020-01-15 ASSESSMENT — PAIN SCALES - GENERAL
PAINLEVEL_OUTOF10: 0
PAINLEVEL_OUTOF10: 5
PAINLEVEL_OUTOF10: 0
PAINLEVEL_OUTOF10: 0
PAINLEVEL_OUTOF10: 8
PAINLEVEL_OUTOF10: 9

## 2020-01-15 ASSESSMENT — PAIN DESCRIPTION - LOCATION: LOCATION: BACK

## 2020-01-15 ASSESSMENT — PAIN DESCRIPTION - PAIN TYPE: TYPE: CHRONIC PAIN

## 2020-01-15 NOTE — H&P
[address] [place] [address], [address] [facility]                              HISTORY AND PHYSICAL    PATIENT NAME: Umesh Briones                      :        1955  MED REC NO:   [id number]                          ROOM:  ACCOUNT NO:   [de-identified]                           ADMIT DATE: 01/15/2020  PROVIDER:     Todd Medeiros MD    INDICATION:  Abnormal heart catheterization. The patient was here on 2019. The patient underwent a heart cath. Left main was patent. LAD had mild disease. Circ had 80% stenosis  which was stented. RCA has 90% stenosis present. The patient is here  for a staged angioplasty today. The patient is doing okay. He has not  had any other complaints at this time. PAST MEDICAL HISTORY:  Coronary artery disease status post angioplasty  done of the circ. The patient is here for angioplasty of the RCA. The  patient sees Dr. Darren Camp and Dr. Rosalee Killian also. The patient has a  history of having asbestos exposure, BPH, _____, COPD, fatty liver,  kidney stones, rheumatoid arthritis, hypertension, hyperlipidemia, and  diabetes present. PAST SURGICAL HISTORY:  Bilateral hip replacement, gallbladder surgery,  orthopedic surgery, and angioplasty of the circ. SOCIAL HISTORY:  Does not smoke, does not drink. ALLERGIES:  NKDA. MEDICATIONS AT HOME:  He is on Plavix, Wellbutrin, BuSpar, Coreg,  Cozaar, Crestor, Flomax, Hytrin. See the list.    PHYSICAL EXAMINATION:  GENERAL:  The patient is awake, alert and answering questions. VITAL SIGNS:  Temperature is afebrile, pulse is 86, blood pressure  142/82. HEENT:  Head is normocephalic and atraumatic. Pupils are equal and  reactive. CHEST:  Equal expansion. LUNGS:  Clear to auscultation. No wheezing or rhonchi. HEART:  Regular rhythm. ABDOMEN:  Soft and nontender. Bowel sounds are present.   No  hepatosplenomegaly or guarding appreciated. EXTREMITIES:  No cyanosis or clubbing noted. NEUROLOGIC:  Cranial nerves II through XII are grossly intact. IMPRESSION:  This is a 75-year-old male patient with a history of having  coronary artery disease status post angioplasty done of the circ. The  patient is here for angioplasty of the right coronary artery today with  possible CSI, atherectomy with calcified vessel present.         Karmen Yap MD    D: 01/15/2020 8:06:30       T: 01/15/2020 8:31:15     [email or web address]  Job#: [id number]     Doc#: [id number]    CC:

## 2020-01-15 NOTE — OP NOTE
[facility]--[id number]  LVEDP 20  RCA MID 90% CALCIFIED STENOSIS  ATHRECTOMY -CSI-CORONARY AND PREDILATED WITH A 2.5 BALLOON AND STENTED WITH MEIR XIENCE 3.5X28 AND 3.5X38MM STENTS   AND POST DILATED WITH A 3.75X NC BALLOON  NO COMPLICATIONS  ASA AND PLAVIX AND HEPARIN AND RIGHT RADIAL APPROACH

## 2020-01-15 NOTE — PLAN OF CARE
Problem: Cardiac:  Goal: Ability to maintain vital signs within normal range will improve  Description  Ability to maintain vital signs within normal range will improve  Outcome: Ongoing  Goal: Cardiovascular alteration will improve  Description  Cardiovascular alteration will improve  Outcome: Ongoing

## 2020-01-15 NOTE — PLAN OF CARE
Report called to [employer]. Will transfer patient to room 3126 via wheelchair.  Abimbola Johnston RN 1/15/2020

## 2020-01-15 NOTE — PROCEDURES
of the right coronary artery. The patient already had an AL1 catheter. The patient was anticoagulated  with heparin. ACT was kept around 250. The patient received Plavix 300  mg post procedure and aspirin 325 mg post procedure. Then, using a Runthrough wire, it was crossed into the distal right  coronary artery. An attempt was made to use the support catheters,  Turnpike catheters, straight and spiral, both will not cross. The  lesion was predilated with a 1.2 balloon and then 1.5 balloon. Both  were not advanced also again. Then, at that time, a Viper wire was  placed, and CSI was used. Atherectomy was performed with [facility] of  the right coronary artery with a 1.25 crown at the low and high speed. After multiple passes done, the debulking of the plaque was performed. After that, the lesion was predilated with a 2.5 x 20 noncompliant  balloon. The lesion was stented with drug-eluting stents, Xience 3.5 x  28 mm and then 3.5 x 38. Both the stents were overlapped to each other  and deployed at high pressure. The stent was post-dilated with a 3.75 x  20 noncompliant balloon. Final angiogram showed there was HYALEY-3 flow  present. No dissection, perforation, or distal embolization noted. A  GuideLiner was also used. IMPRESSION:  Successful angioplasty of the right coronary artery. Lesion decreased from mid 90% stenosis calcified to 0% with drug-eluting  stents, Xience 3.5 x 28 and 3.5 x 38 and post dilated with a 3.75  noncompliant balloon. The patient tolerated the procedure well. No  complications noted. Sheath was removed. TR band was placed. The  patient will be observed overnight and will be discharged tomorrow if  stable.     Blood loss 20cc    Stanislaw Garcia MD    D: 01/15/2020 12:47:38       T: 01/15/2020 13:26:00     [email or web address]  Job#: [id number]     Doc#: [id number]    CC:

## 2020-01-16 VITALS
TEMPERATURE: 98.5 F | BODY MASS INDEX: 41.75 KG/M2 | HEART RATE: 116 BPM | OXYGEN SATURATION: 95 % | RESPIRATION RATE: 19 BRPM | HEIGHT: 73 IN | DIASTOLIC BLOOD PRESSURE: 76 MMHG | WEIGHT: 315 LBS | SYSTOLIC BLOOD PRESSURE: 147 MMHG

## 2020-01-16 LAB
ALBUMIN SERPL-MCNC: 3.8 GM/DL (ref 3.4–5)
ALP BLD-CCNC: 106 IU/L (ref 40–128)
ALT SERPL-CCNC: 46 U/L (ref 10–40)
ANION GAP SERPL CALCULATED.3IONS-SCNC: 13 MMOL/L (ref 4–16)
AST SERPL-CCNC: 41 IU/L (ref 15–37)
BILIRUB SERPL-MCNC: 0.4 MG/DL (ref 0–1)
BUN BLDV-MCNC: 14 MG/DL (ref 6–23)
CALCIUM SERPL-MCNC: 9.1 MG/DL (ref 8.3–10.6)
CHLORIDE BLD-SCNC: 101 MMOL/L (ref 99–110)
CO2: 27 MMOL/L (ref 21–32)
CREAT SERPL-MCNC: 0.6 MG/DL (ref 0.9–1.3)
GFR AFRICAN AMERICAN: >60 ML/MIN/1.73M2
GFR NON-AFRICAN AMERICAN: >60 ML/MIN/1.73M2
GLUCOSE BLD-MCNC: 124 MG/DL (ref 70–99)
HCT VFR BLD CALC: 46.1 % (ref 42–52)
HEMOGLOBIN: 14.7 GM/DL (ref 13.5–18)
MCH RBC QN AUTO: 29.3 PG (ref 27–31)
MCHC RBC AUTO-ENTMCNC: 31.9 % (ref 32–36)
MCV RBC AUTO: 92 FL (ref 78–100)
PDW BLD-RTO: 12.4 % (ref 11.7–14.9)
PLATELET # BLD: 151 K/CU MM (ref 140–440)
PMV BLD AUTO: 9 FL (ref 7.5–11.1)
POTASSIUM SERPL-SCNC: 3.9 MMOL/L (ref 3.5–5.1)
RBC # BLD: 5.01 M/CU MM (ref 4.6–6.2)
SODIUM BLD-SCNC: 141 MMOL/L (ref 135–145)
TOTAL PROTEIN: 6.1 GM/DL (ref 6.4–8.2)
WBC # BLD: 6 K/CU MM (ref 4–10.5)

## 2020-01-16 PROCEDURE — 85027 COMPLETE CBC AUTOMATED: CPT

## 2020-01-16 PROCEDURE — 80053 COMPREHEN METABOLIC PANEL: CPT

## 2020-01-16 PROCEDURE — 94640 AIRWAY INHALATION TREATMENT: CPT

## 2020-01-16 PROCEDURE — 6370000000 HC RX 637 (ALT 250 FOR IP): Performed by: INTERNAL MEDICINE

## 2020-01-16 PROCEDURE — 36415 COLL VENOUS BLD VENIPUNCTURE: CPT

## 2020-01-16 PROCEDURE — 94761 N-INVAS EAR/PLS OXIMETRY MLT: CPT

## 2020-01-16 PROCEDURE — 2580000003 HC RX 258: Performed by: INTERNAL MEDICINE

## 2020-01-16 RX ADMIN — SODIUM CHLORIDE, PRESERVATIVE FREE 10 ML: 5 INJECTION INTRAVENOUS at 10:11

## 2020-01-16 RX ADMIN — CLOPIDOGREL BISULFATE 75 MG: 75 TABLET ORAL at 10:09

## 2020-01-16 RX ADMIN — OXYCODONE HYDROCHLORIDE AND ACETAMINOPHEN 1 TABLET: 5; 325 TABLET ORAL at 05:53

## 2020-01-16 RX ADMIN — CARVEDILOL 6.25 MG: 6.25 TABLET, FILM COATED ORAL at 10:09

## 2020-01-16 RX ADMIN — AMLODIPINE BESYLATE 10 MG: 10 TABLET ORAL at 10:10

## 2020-01-16 RX ADMIN — LOSARTAN POTASSIUM 100 MG: 100 TABLET, FILM COATED ORAL at 10:10

## 2020-01-16 RX ADMIN — OXYCODONE HYDROCHLORIDE AND ACETAMINOPHEN 1 TABLET: 5; 325 TABLET ORAL at 10:46

## 2020-01-16 RX ADMIN — ROSUVASTATIN CALCIUM 10 MG: 5 TABLET, COATED ORAL at 10:09

## 2020-01-16 RX ADMIN — BUSPIRONE HYDROCHLORIDE 5 MG: 5 TABLET ORAL at 10:10

## 2020-01-16 RX ADMIN — TAMSULOSIN HYDROCHLORIDE 0.4 MG: 0.4 CAPSULE ORAL at 10:10

## 2020-01-16 RX ADMIN — POTASSIUM CHLORIDE 20 MEQ: 1500 TABLET, EXTENDED RELEASE ORAL at 10:09

## 2020-01-16 RX ADMIN — BUPROPION HYDROCHLORIDE 150 MG: 150 TABLET, FILM COATED, EXTENDED RELEASE ORAL at 10:09

## 2020-01-16 RX ADMIN — ASPIRIN 81 MG 81 MG: 81 TABLET ORAL at 10:10

## 2020-01-16 RX ADMIN — GLYCOPYRROLATE AND FORMOTEROL FUMARATE 2 PUFF: 9; 4.8 AEROSOL, METERED RESPIRATORY (INHALATION) at 08:31

## 2020-01-16 RX ADMIN — FUROSEMIDE 40 MG: 40 TABLET ORAL at 10:10

## 2020-01-16 ASSESSMENT — PAIN SCALES - GENERAL
PAINLEVEL_OUTOF10: 5
PAINLEVEL_OUTOF10: 4
PAINLEVEL_OUTOF10: 9
PAINLEVEL_OUTOF10: 9

## 2020-01-16 NOTE — PLAN OF CARE
Problem: Cardiac:  Goal: Ability to maintain vital signs within normal range will improve  Description  Ability to maintain vital signs within normal range will improve  1/15/2020 2250 by Jenna Elder RN  Outcome: Ongoing     Problem: Cardiac:  Goal: Cardiovascular alteration will improve  Description  Cardiovascular alteration will improve  1/15/2020 2250 by Jenna Elder RN  Outcome: Ongoing     Problem: Physical Regulation:  Goal: Complications related to the disease process, condition or treatment will be avoided or minimized  Description  Complications related to the disease process, condition or treatment will be avoided or minimized  1/15/2020 2250 by Jenna Elder RN  Outcome: Ongoing     Problem: Pain:  Goal: Pain level will decrease  Description  Pain level will decrease  Outcome: Ongoing     Problem: Pain:  Goal: Control of acute pain  Description  Control of acute pain  Outcome: Ongoing     Problem: Pain:  Goal: Control of chronic pain  Description  Control of chronic pain  Outcome: Ongoing     Problem: Infection:  Goal: Will remain free from infection  Description  Will remain free from infection  Outcome: Ongoing     Problem: Safety:  Goal: Free from accidental physical injury  Description  Free from accidental physical injury  Outcome: Ongoing     Problem: Daily Care:  Goal: Daily care needs are met  Description  Daily care needs are met  Outcome: Ongoing     Problem: Discharge Planning:  Goal: Patients continuum of care needs are met  Description  Patients continuum of care needs are met  Outcome: Ongoing

## 2020-01-17 NOTE — DISCHARGE SUMMARY
the chest.  Clear to auscultation  bilaterally. ABDOMEN:  Soft, nontender. MUSCULOSKELETAL:  No obvious joint deformities. SKIN:  Minimal ecchymosis noted to the right radial site. No pain to  palpation at this area. Good pulse. No ecchymosis or erythema noted  otherwise. NEUROLOGIC:  Alert and oriented x4. No numbness or tingling to the  right hand. PSYCHIATRIC:  Normal mood and affect. LABORATORY DATA:  CMP has been done today. It shows potassium is stable  at 3.9. Creatinine is stable at 0.6. Everything else is within normal  limits. LFTs have been done. ALT is elevated at 46 and AST is elevated  at 41. This is actually improved from previous. CBC is drawn, it shows  that hemoglobin is stable at 14.7. RADIOLOGIC DATA:  Heart catheterization was done. It shows RCA mid 90%  calcified stenosis, status post CSI atherectomy, stented with two  drug-eluting stents and post dilated. LVEDP was about 20. DISPOSITION:  Site is stable. Blood work is stable. The patient is  going to walk. Make sure that he is doing okay before he goes home. The patient is stable for discharge to home at this time. FOLLOWUP:  The patient is to follow up at the office in one week.     Agree with above     JORGE Malin    D: 01/16/2020 9:31:51       T: 01/16/2020 9:58:20     [email or web address]  Job#: [id number]     Doc#: [id number]    CC:  Cassius Houser MD

## 2023-07-25 ENCOUNTER — APPOINTMENT (OUTPATIENT)
Dept: GENERAL RADIOLOGY | Age: 68
DRG: 291 | End: 2023-07-25
Attending: EMERGENCY MEDICINE
Payer: MEDICARE

## 2023-07-25 ENCOUNTER — HOSPITAL ENCOUNTER (INPATIENT)
Age: 68
LOS: 5 days | Discharge: HOME | DRG: 291 | End: 2023-07-30
Attending: EMERGENCY MEDICINE | Admitting: STUDENT IN AN ORGANIZED HEALTH CARE EDUCATION/TRAINING PROGRAM
Payer: MEDICARE

## 2023-07-25 DIAGNOSIS — R77.8 ELEVATED TROPONIN LEVEL: ICD-10-CM

## 2023-07-25 DIAGNOSIS — M75.102 TEAR OF LEFT ROTATOR CUFF, UNSPECIFIED TEAR EXTENT, UNSPECIFIED WHETHER TRAUMATIC: ICD-10-CM

## 2023-07-25 DIAGNOSIS — I50.9 ACUTE CONGESTIVE HEART FAILURE, UNSPECIFIED HEART FAILURE TYPE (HCC): Primary | ICD-10-CM

## 2023-07-25 PROBLEM — J96.00 RESPIRATORY FAILURE, ACUTE (HCC): Status: ACTIVE | Noted: 2023-07-25

## 2023-07-25 LAB
ALBUMIN SERPL-MCNC: 3.1 GM/DL (ref 3.4–5)
ALP BLD-CCNC: 115 IU/L (ref 40–129)
ALT SERPL-CCNC: 19 U/L (ref 10–40)
ANION GAP SERPL CALCULATED.3IONS-SCNC: 5 MMOL/L (ref 4–16)
AST SERPL-CCNC: 22 IU/L (ref 15–37)
BASE EXCESS MIXED: 15 (ref 0–1.2)
BASOPHILS ABSOLUTE: 0.1 K/CU MM
BASOPHILS RELATIVE PERCENT: 0.9 % (ref 0–1)
BILIRUB SERPL-MCNC: 0.2 MG/DL (ref 0–1)
BUN SERPL-MCNC: 25 MG/DL (ref 6–23)
CALCIUM SERPL-MCNC: 9 MG/DL (ref 8.3–10.6)
CARBON MONOXIDE, BLOOD: 2.6 % (ref 0–5)
CHLORIDE BLD-SCNC: 89 MMOL/L (ref 99–110)
CO2 CONTENT: 47.2 MMOL/L (ref 19–24)
CO2: 37 MMOL/L (ref 21–32)
COMMENT: ABNORMAL
CREAT SERPL-MCNC: 1.1 MG/DL (ref 0.9–1.3)
DIFFERENTIAL TYPE: ABNORMAL
EOSINOPHILS ABSOLUTE: 0.2 K/CU MM
EOSINOPHILS RELATIVE PERCENT: 2.1 % (ref 0–3)
GFR SERPL CREATININE-BSD FRML MDRD: >60 ML/MIN/1.73M2
GLUCOSE BLD-MCNC: 155 MG/DL (ref 70–99)
GLUCOSE SERPL-MCNC: 237 MG/DL (ref 70–99)
HCO3 ARTERIAL: 44.7 MMOL/L (ref 18–23)
HCT VFR BLD CALC: 49.1 % (ref 42–52)
HEMOGLOBIN: 15.3 GM/DL (ref 13.5–18)
IMMATURE NEUTROPHIL %: 0.3 % (ref 0–0.43)
LYMPHOCYTES ABSOLUTE: 1.1 K/CU MM
LYMPHOCYTES RELATIVE PERCENT: 15.8 % (ref 24–44)
MCH RBC QN AUTO: 28.7 PG (ref 27–31)
MCHC RBC AUTO-ENTMCNC: 31.2 % (ref 32–36)
MCV RBC AUTO: 91.9 FL (ref 78–100)
METHEMOGLOBIN ARTERIAL: 1.4 %
MONOCYTES ABSOLUTE: 0.6 K/CU MM
MONOCYTES RELATIVE PERCENT: 8 % (ref 0–4)
NUCLEATED RBC %: 0 %
O2 SATURATION: 92.3 % (ref 96–97)
PCO2 ARTERIAL: 81 MMHG (ref 32–45)
PDW BLD-RTO: 13 % (ref 11.7–14.9)
PH BLOOD: 7.35 (ref 7.34–7.45)
PLATELET # BLD: 168 K/CU MM (ref 140–440)
PMV BLD AUTO: 9.2 FL (ref 7.5–11.1)
PO2 ARTERIAL: 79 MMHG (ref 75–100)
POTASSIUM SERPL-SCNC: 4.6 MMOL/L (ref 3.5–5.1)
PRO-BNP: 9829 PG/ML
RBC # BLD: 5.34 M/CU MM (ref 4.6–6.2)
SEGMENTED NEUTROPHILS ABSOLUTE COUNT: 5.1 K/CU MM
SEGMENTED NEUTROPHILS RELATIVE PERCENT: 72.9 % (ref 36–66)
SODIUM BLD-SCNC: 131 MMOL/L (ref 135–145)
T4 FREE SERPL-MCNC: 0.81 NG/DL (ref 0.9–1.8)
TOTAL IMMATURE NEUTOROPHIL: 0.02 K/CU MM
TOTAL NUCLEATED RBC: 0 K/CU MM
TOTAL PROTEIN: 5.3 GM/DL (ref 6.4–8.2)
TROPONIN T: 0.04 NG/ML
TSH SERPL DL<=0.005 MIU/L-ACNC: 2.42 UIU/ML (ref 0.27–4.2)
WBC # BLD: 7 K/CU MM (ref 4–10.5)

## 2023-07-25 PROCEDURE — 36600 WITHDRAWAL OF ARTERIAL BLOOD: CPT

## 2023-07-25 PROCEDURE — 6360000002 HC RX W HCPCS: Performed by: EMERGENCY MEDICINE

## 2023-07-25 PROCEDURE — 6370000000 HC RX 637 (ALT 250 FOR IP): Performed by: STUDENT IN AN ORGANIZED HEALTH CARE EDUCATION/TRAINING PROGRAM

## 2023-07-25 PROCEDURE — 82962 GLUCOSE BLOOD TEST: CPT

## 2023-07-25 PROCEDURE — 84443 ASSAY THYROID STIM HORMONE: CPT

## 2023-07-25 PROCEDURE — 2580000003 HC RX 258: Performed by: STUDENT IN AN ORGANIZED HEALTH CARE EDUCATION/TRAINING PROGRAM

## 2023-07-25 PROCEDURE — 71045 X-RAY EXAM CHEST 1 VIEW: CPT

## 2023-07-25 PROCEDURE — 83880 ASSAY OF NATRIURETIC PEPTIDE: CPT

## 2023-07-25 PROCEDURE — 96374 THER/PROPH/DIAG INJ IV PUSH: CPT

## 2023-07-25 PROCEDURE — 6360000002 HC RX W HCPCS: Performed by: STUDENT IN AN ORGANIZED HEALTH CARE EDUCATION/TRAINING PROGRAM

## 2023-07-25 PROCEDURE — 85025 COMPLETE CBC W/AUTO DIFF WBC: CPT

## 2023-07-25 PROCEDURE — 82803 BLOOD GASES ANY COMBINATION: CPT

## 2023-07-25 PROCEDURE — 84439 ASSAY OF FREE THYROXINE: CPT

## 2023-07-25 PROCEDURE — 84484 ASSAY OF TROPONIN QUANT: CPT

## 2023-07-25 PROCEDURE — 36415 COLL VENOUS BLD VENIPUNCTURE: CPT

## 2023-07-25 PROCEDURE — 1200000000 HC SEMI PRIVATE

## 2023-07-25 PROCEDURE — 5A09557 ASSISTANCE WITH RESPIRATORY VENTILATION, GREATER THAN 96 CONSECUTIVE HOURS, CONTINUOUS POSITIVE AIRWAY PRESSURE: ICD-10-PCS | Performed by: INTERNAL MEDICINE

## 2023-07-25 PROCEDURE — 2700000000 HC OXYGEN THERAPY PER DAY

## 2023-07-25 PROCEDURE — 80053 COMPREHEN METABOLIC PANEL: CPT

## 2023-07-25 PROCEDURE — 6370000000 HC RX 637 (ALT 250 FOR IP): Performed by: EMERGENCY MEDICINE

## 2023-07-25 PROCEDURE — 99285 EMERGENCY DEPT VISIT HI MDM: CPT

## 2023-07-25 PROCEDURE — 94660 CPAP INITIATION&MGMT: CPT

## 2023-07-25 PROCEDURE — 93005 ELECTROCARDIOGRAM TRACING: CPT | Performed by: EMERGENCY MEDICINE

## 2023-07-25 PROCEDURE — 94640 AIRWAY INHALATION TREATMENT: CPT

## 2023-07-25 RX ORDER — ACETAZOLAMIDE 250 MG/1
250 TABLET ORAL ONCE
Status: COMPLETED | OUTPATIENT
Start: 2023-07-25 | End: 2023-07-25

## 2023-07-25 RX ORDER — POTASSIUM CHLORIDE 7.45 MG/ML
10 INJECTION INTRAVENOUS PRN
Status: DISCONTINUED | OUTPATIENT
Start: 2023-07-25 | End: 2023-07-30 | Stop reason: HOSPADM

## 2023-07-25 RX ORDER — ASPIRIN 81 MG/1
81 TABLET, CHEWABLE ORAL DAILY
Status: DISCONTINUED | OUTPATIENT
Start: 2023-07-26 | End: 2023-07-30 | Stop reason: HOSPADM

## 2023-07-25 RX ORDER — SPIRONOLACTONE 50 MG/1
25 TABLET, FILM COATED ORAL DAILY
Status: DISCONTINUED | OUTPATIENT
Start: 2023-07-26 | End: 2023-07-30 | Stop reason: HOSPADM

## 2023-07-25 RX ORDER — INSULIN LISPRO 100 [IU]/ML
0-4 INJECTION, SOLUTION INTRAVENOUS; SUBCUTANEOUS
Status: DISCONTINUED | OUTPATIENT
Start: 2023-07-26 | End: 2023-07-30 | Stop reason: HOSPADM

## 2023-07-25 RX ORDER — MAGNESIUM SULFATE IN WATER 40 MG/ML
2000 INJECTION, SOLUTION INTRAVENOUS PRN
Status: DISCONTINUED | OUTPATIENT
Start: 2023-07-25 | End: 2023-07-30 | Stop reason: HOSPADM

## 2023-07-25 RX ORDER — SODIUM CHLORIDE 0.9 % (FLUSH) 0.9 %
5-40 SYRINGE (ML) INJECTION PRN
Status: DISCONTINUED | OUTPATIENT
Start: 2023-07-25 | End: 2023-07-30 | Stop reason: HOSPADM

## 2023-07-25 RX ORDER — ACETAMINOPHEN 325 MG/1
650 TABLET ORAL EVERY 6 HOURS PRN
Status: DISCONTINUED | OUTPATIENT
Start: 2023-07-25 | End: 2023-07-30 | Stop reason: HOSPADM

## 2023-07-25 RX ORDER — METOPROLOL SUCCINATE 50 MG/1
50 TABLET, EXTENDED RELEASE ORAL DAILY
Status: DISCONTINUED | OUTPATIENT
Start: 2023-07-26 | End: 2023-07-25

## 2023-07-25 RX ORDER — POTASSIUM CHLORIDE 20 MEQ/1
40 TABLET, EXTENDED RELEASE ORAL PRN
Status: DISCONTINUED | OUTPATIENT
Start: 2023-07-25 | End: 2023-07-30 | Stop reason: HOSPADM

## 2023-07-25 RX ORDER — FUROSEMIDE 10 MG/ML
40 INJECTION INTRAMUSCULAR; INTRAVENOUS ONCE
Status: COMPLETED | OUTPATIENT
Start: 2023-07-25 | End: 2023-07-25

## 2023-07-25 RX ORDER — ACETAMINOPHEN 650 MG/1
650 SUPPOSITORY RECTAL EVERY 6 HOURS PRN
Status: DISCONTINUED | OUTPATIENT
Start: 2023-07-25 | End: 2023-07-30 | Stop reason: HOSPADM

## 2023-07-25 RX ORDER — LISINOPRIL 5 MG/1
5 TABLET ORAL DAILY
Status: DISCONTINUED | OUTPATIENT
Start: 2023-07-26 | End: 2023-07-27

## 2023-07-25 RX ORDER — ROSUVASTATIN CALCIUM 20 MG/1
40 TABLET, COATED ORAL NIGHTLY
Status: DISCONTINUED | OUTPATIENT
Start: 2023-07-25 | End: 2023-07-25

## 2023-07-25 RX ORDER — FUROSEMIDE 10 MG/ML
40 INJECTION INTRAMUSCULAR; INTRAVENOUS 2 TIMES DAILY
Status: DISCONTINUED | OUTPATIENT
Start: 2023-07-26 | End: 2023-07-27

## 2023-07-25 RX ORDER — BUDESONIDE AND FORMOTEROL FUMARATE DIHYDRATE 160; 4.5 UG/1; UG/1
2 AEROSOL RESPIRATORY (INHALATION)
Status: DISCONTINUED | OUTPATIENT
Start: 2023-07-25 | End: 2023-07-30 | Stop reason: HOSPADM

## 2023-07-25 RX ORDER — INSULIN LISPRO 100 [IU]/ML
0-4 INJECTION, SOLUTION INTRAVENOUS; SUBCUTANEOUS NIGHTLY
Status: DISCONTINUED | OUTPATIENT
Start: 2023-07-25 | End: 2023-07-30 | Stop reason: HOSPADM

## 2023-07-25 RX ORDER — ENOXAPARIN SODIUM 100 MG/ML
30 INJECTION SUBCUTANEOUS 2 TIMES DAILY
Status: DISCONTINUED | OUTPATIENT
Start: 2023-07-25 | End: 2023-07-30 | Stop reason: HOSPADM

## 2023-07-25 RX ORDER — GLUCAGON 1 MG/ML
1 KIT INJECTION PRN
Status: DISCONTINUED | OUTPATIENT
Start: 2023-07-25 | End: 2023-07-30 | Stop reason: HOSPADM

## 2023-07-25 RX ORDER — ONDANSETRON 4 MG/1
4 TABLET, ORALLY DISINTEGRATING ORAL EVERY 8 HOURS PRN
Status: DISCONTINUED | OUTPATIENT
Start: 2023-07-25 | End: 2023-07-30 | Stop reason: HOSPADM

## 2023-07-25 RX ORDER — POLYETHYLENE GLYCOL 3350 17 G/17G
17 POWDER, FOR SOLUTION ORAL DAILY PRN
Status: DISCONTINUED | OUTPATIENT
Start: 2023-07-25 | End: 2023-07-30 | Stop reason: HOSPADM

## 2023-07-25 RX ORDER — CLOPIDOGREL BISULFATE 75 MG/1
75 TABLET ORAL DAILY
Status: DISCONTINUED | OUTPATIENT
Start: 2023-07-25 | End: 2023-07-30 | Stop reason: HOSPADM

## 2023-07-25 RX ORDER — BUMETANIDE 0.25 MG/ML
2 INJECTION INTRAMUSCULAR; INTRAVENOUS 2 TIMES DAILY
Status: DISCONTINUED | OUTPATIENT
Start: 2023-07-25 | End: 2023-07-25

## 2023-07-25 RX ORDER — ASPIRIN 81 MG/1
324 TABLET, CHEWABLE ORAL ONCE
Status: COMPLETED | OUTPATIENT
Start: 2023-07-25 | End: 2023-07-25

## 2023-07-25 RX ORDER — SODIUM CHLORIDE 9 MG/ML
INJECTION, SOLUTION INTRAVENOUS PRN
Status: DISCONTINUED | OUTPATIENT
Start: 2023-07-25 | End: 2023-07-30 | Stop reason: HOSPADM

## 2023-07-25 RX ORDER — DEXTROSE MONOHYDRATE 100 MG/ML
1000 INJECTION, SOLUTION INTRAVENOUS CONTINUOUS PRN
Status: DISCONTINUED | OUTPATIENT
Start: 2023-07-25 | End: 2023-07-30 | Stop reason: HOSPADM

## 2023-07-25 RX ORDER — ROSUVASTATIN CALCIUM 20 MG/1
20 TABLET, COATED ORAL NIGHTLY
Status: DISCONTINUED | OUTPATIENT
Start: 2023-07-26 | End: 2023-07-26

## 2023-07-25 RX ORDER — ONDANSETRON 2 MG/ML
4 INJECTION INTRAMUSCULAR; INTRAVENOUS EVERY 6 HOURS PRN
Status: DISCONTINUED | OUTPATIENT
Start: 2023-07-25 | End: 2023-07-30 | Stop reason: HOSPADM

## 2023-07-25 RX ORDER — SODIUM CHLORIDE 0.9 % (FLUSH) 0.9 %
5-40 SYRINGE (ML) INJECTION EVERY 12 HOURS SCHEDULED
Status: DISCONTINUED | OUTPATIENT
Start: 2023-07-25 | End: 2023-07-30 | Stop reason: HOSPADM

## 2023-07-25 RX ORDER — CARVEDILOL 6.25 MG/1
12.5 TABLET ORAL 2 TIMES DAILY WITH MEALS
Status: DISCONTINUED | OUTPATIENT
Start: 2023-07-26 | End: 2023-07-29

## 2023-07-25 RX ORDER — TAMSULOSIN HYDROCHLORIDE 0.4 MG/1
0.4 CAPSULE ORAL DAILY
Status: DISCONTINUED | OUTPATIENT
Start: 2023-07-25 | End: 2023-07-30 | Stop reason: HOSPADM

## 2023-07-25 RX ADMIN — BUDESONIDE AND FORMOTEROL FUMARATE DIHYDRATE 2 PUFF: 160; 4.5 AEROSOL RESPIRATORY (INHALATION) at 21:02

## 2023-07-25 RX ADMIN — CLOPIDOGREL BISULFATE 75 MG: 75 TABLET ORAL at 23:24

## 2023-07-25 RX ADMIN — FUROSEMIDE 40 MG: 10 INJECTION, SOLUTION INTRAMUSCULAR; INTRAVENOUS at 19:33

## 2023-07-25 RX ADMIN — ACETAZOLAMIDE 250 MG: 250 TABLET ORAL at 23:23

## 2023-07-25 RX ADMIN — ASPIRIN 324 MG: 81 TABLET, CHEWABLE ORAL at 19:38

## 2023-07-25 RX ADMIN — TAMSULOSIN HYDROCHLORIDE 0.4 MG: 0.4 CAPSULE ORAL at 23:24

## 2023-07-25 RX ADMIN — IPRATROPIUM BROMIDE 2 PUFF: 17 AEROSOL, METERED RESPIRATORY (INHALATION) at 21:01

## 2023-07-25 RX ADMIN — ENOXAPARIN SODIUM 30 MG: 100 INJECTION SUBCUTANEOUS at 23:23

## 2023-07-25 RX ADMIN — SODIUM CHLORIDE, PRESERVATIVE FREE 10 ML: 5 INJECTION INTRAVENOUS at 23:27

## 2023-07-25 ASSESSMENT — PAIN SCALES - GENERAL: PAINLEVEL_OUTOF10: 10

## 2023-07-25 NOTE — H&P
81 mg  81 mg Oral Daily Phong Davis MD        clopidogrel (PLAVIX) tablet 75 mg  75 mg Oral Daily Phong Dvais MD        glucose chewable tablet 16 g  4 tablet Oral PRN Phong Davis MD        dextrose bolus 10% 125 mL  125 mL IntraVENous PRN Phong Davis MD        Or    dextrose bolus 10% 250 mL  250 mL IntraVENous PRN Phong Davis MD        glucagon injection 1 mg  1 mg SubCUTAneous PRN Phong Davis MD        dextrose 10 % infusion  1,000 mL IntraVENous Continuous PRN Phong Davis MD        [START ON 7/26/2023] furosemide (LASIX) injection 40 mg  40 mg IntraVENous BID Phong Davis MD        [START ON 7/26/2023] metoprolol succinate (TOPROL XL) extended release tablet 50 mg  50 mg Oral Daily Phong Davis MD           Labs      CBC:   Recent Labs     07/25/23  1839   WBC 7.0   HGB 15.3        BMP:    Recent Labs     07/25/23  1839   *   K 4.6   CL 89*   CO2 37*   BUN 25*   CREATININE 1.1   GLUCOSE 237*     Hepatic:   Recent Labs     07/25/23  1839   AST 22   ALT 19   BILITOT 0.2   ALKPHOS 115     Troponin:   Lab Results   Component Value Date/Time    TROPONINT 0.038 07/25/2023 06:30 PM    TROPONINT <0.010 02/06/2019 06:44 PM     BNP:   Recent Labs     07/25/23  1839   PROBNP 9,829*      Latest Reference Range & Units 07/25/23 20:45   pH, Bld 7.34 - 7.45  7.35   O2 Sat 96 - 97 % 92.3 (L)   Carbon Monoxide, Blood 0 - 5 % 2.6   CO2 Content 19 - 24 MMOL/L 47.2 (H)   pCO2, Arterial 32 - 45 MMHG 81.0 (H)   pO2, Arterial 75 - 100 MMHG 79   HCO3, Arterial 18 - 23 MMOL/L 44.7 (H)   Methemoglobin, Arterial <1.5 % 1.4   Base Exc, Mixed 0 - 1.2  15 (H)     Imaging/Diagnostics Last 24 Hours     EXAMINATION:  ONE XRAY VIEW OF THE CHEST     7/25/2023 6:40 pm     COMPARISON:  08/15/2008     HISTORY:  ORDERING SYSTEM PROVIDED HISTORY: CP/chf  TECHNOLOGIST PROVIDED HISTORY:  Reason for exam:->CP/chf  Reason for Exam: CP/chf  Additional signs and symptoms: na  Relevant Medical/Surgical

## 2023-07-25 NOTE — ACP (ADVANCE CARE PLANNING)
Patient does not have any ACP documents/Medical Power of . LSW notes hospital will follow [place]'s Next of Kin hierarchy in the following descending order for priority:    Guardian  Spouse  Majority of adult Children  Parents  Majority of adult Siblings  Nearest Relative not described above    Per [place]'s Next of Kin hierarchy: Patients' Brothers/Sisters will be Primary Healthcare Decision Maker.

## 2023-07-25 NOTE — ED PROVIDER NOTES
as of 07/25/23 1947   Tue Jul 25, 2023   1851 EKG 12 Lead  Rate 103, sinus tachycardia, prolonged QTc of 510 ms, otherwise normal intervals, no acute ST elevations, no definite acute injury pattern. [BD]   1851 XR CHEST PORTABLE  On my wet read of chest x-ray, small bibasilar effusions noted. Radiology over read to follow. [BD]   1918 Pro-BNP(!): 9,829 [BD]   1918 Troponin T(!): 0.038 [BD]   1918 Comprehensive Metabolic Panel(!):    Sodium 131(!)   Potassium 4.6   Chloride 89(!)   CO2 37(!)   BUN,BUNPL 25(!)   Creatinine 1.1   Est, Glom Filt Rate >60   Glucose, Random 237(!)   CALCIUM, SERUM, 999697 9.0   Albumin 3.1(!)   Total Protein 5.3(!)   BILIRUBIN TOTAL 0.2   ALT 19   AST 22   Alk Phos 115   Anion Gap 5 [BD]      ED Course User Index  [BD] Anthony Donald MD       Labs      Radiology      EKG Interpretation. Anthony Donald MD  07/25/23 1950    Impression:  1.  Acute congestive heart failure, unspecified heart failure type ([address])    2. Elevated troponin level           Anthony Donald MD  07/26/23 8024

## 2023-07-26 ENCOUNTER — APPOINTMENT (OUTPATIENT)
Dept: CT IMAGING | Age: 68
DRG: 291 | End: 2023-07-26
Payer: MEDICARE

## 2023-07-26 LAB
ANION GAP SERPL CALCULATED.3IONS-SCNC: 6 MMOL/L (ref 4–16)
BASE EXCESS MIXED: 13.4 (ref 0–1.2)
BASE EXCESS MIXED: 13.5 (ref 0–1.2)
BASE EXCESS MIXED: 14.2 (ref 0–1.2)
BASOPHILS ABSOLUTE: 0 K/CU MM
BASOPHILS RELATIVE PERCENT: 0.4 % (ref 0–1)
BUN SERPL-MCNC: 24 MG/DL (ref 6–23)
CALCIUM SERPL-MCNC: 9.1 MG/DL (ref 8.3–10.6)
CARBON MONOXIDE, BLOOD: 2.5 % (ref 0–5)
CARBON MONOXIDE, BLOOD: 2.6 % (ref 0–5)
CHLORIDE BLD-SCNC: 93 MMOL/L (ref 99–110)
CHOLEST SERPL-MCNC: 285 MG/DL
CO2 CONTENT: 46.5 MMOL/L (ref 19–24)
CO2 CONTENT: 49.4 MMOL/L (ref 19–24)
CO2: 36 MMOL/L (ref 21–32)
COMMENT: ABNORMAL
CREAT SERPL-MCNC: 1.1 MG/DL (ref 0.9–1.3)
DIFFERENTIAL TYPE: ABNORMAL
EKG ATRIAL RATE: 103 BPM
EKG DIAGNOSIS: NORMAL
EKG P AXIS: 30 DEGREES
EKG P-R INTERVAL: 162 MS
EKG Q-T INTERVAL: 390 MS
EKG QRS DURATION: 112 MS
EKG QTC CALCULATION (BAZETT): 510 MS
EKG R AXIS: -57 DEGREES
EKG T AXIS: 75 DEGREES
EKG VENTRICULAR RATE: 103 BPM
EOSINOPHILS ABSOLUTE: 0 K/CU MM
EOSINOPHILS RELATIVE PERCENT: 0.4 % (ref 0–3)
GFR SERPL CREATININE-BSD FRML MDRD: >60 ML/MIN/1.73M2
GLUCOSE BLD-MCNC: 113 MG/DL (ref 70–99)
GLUCOSE BLD-MCNC: 176 MG/DL (ref 70–99)
GLUCOSE BLD-MCNC: 176 MG/DL (ref 70–99)
GLUCOSE BLD-MCNC: 257 MG/DL (ref 70–99)
GLUCOSE SERPL-MCNC: 181 MG/DL (ref 70–99)
HCO3 ARTERIAL: 43.8 MMOL/L (ref 18–23)
HCO3 ARTERIAL: 46.2 MMOL/L (ref 18–23)
HCO3 VENOUS: 41.8 MMOL/L (ref 19–25)
HCT VFR BLD CALC: 51.5 % (ref 42–52)
HDLC SERPL-MCNC: 90 MG/DL
HEMOGLOBIN: 15.9 GM/DL (ref 13.5–18)
IMMATURE NEUTROPHIL %: 0.5 % (ref 0–0.43)
INR BLD: 0.9 INDEX
LDLC SERPL CALC-MCNC: 166 MG/DL
LV EF: 33 %
LVEF MODALITY: NORMAL
LYMPHOCYTES ABSOLUTE: 0.6 K/CU MM
LYMPHOCYTES RELATIVE PERCENT: 7.6 % (ref 24–44)
MAGNESIUM: 2 MG/DL (ref 1.8–2.4)
MCH RBC QN AUTO: 28.5 PG (ref 27–31)
MCHC RBC AUTO-ENTMCNC: 30.9 % (ref 32–36)
MCV RBC AUTO: 92.5 FL (ref 78–100)
METHEMOGLOBIN ARTERIAL: 1.3 %
METHEMOGLOBIN ARTERIAL: 1.4 %
MONOCYTES ABSOLUTE: 0.2 K/CU MM
MONOCYTES RELATIVE PERCENT: 3.2 % (ref 0–4)
NUCLEATED RBC %: 0 %
O2 SAT, VEN: 94.5 % (ref 50–70)
O2 SATURATION: 85.8 % (ref 96–97)
O2 SATURATION: 96.2 % (ref 96–97)
PCO2 ARTERIAL: 103 MMHG (ref 32–45)
PCO2 ARTERIAL: 87 MMHG (ref 32–45)
PCO2, VEN: 69 MMHG (ref 38–52)
PDW BLD-RTO: 12.7 % (ref 11.7–14.9)
PH BLOOD: 7.26 (ref 7.34–7.45)
PH BLOOD: 7.31 (ref 7.34–7.45)
PH VENOUS: 7.39 (ref 7.32–7.42)
PLATELET # BLD: 181 K/CU MM (ref 140–440)
PMV BLD AUTO: 9.5 FL (ref 7.5–11.1)
PO2 ARTERIAL: 247 MMHG (ref 75–100)
PO2 ARTERIAL: 60 MMHG (ref 75–100)
PO2, VEN: 132 MMHG (ref 28–48)
POTASSIUM SERPL-SCNC: 4.6 MMOL/L (ref 3.5–5.1)
PROTHROMBIN TIME: 12.7 SECONDS (ref 11.7–14.5)
RBC # BLD: 5.57 M/CU MM (ref 4.6–6.2)
SEGMENTED NEUTROPHILS ABSOLUTE COUNT: 6.6 K/CU MM
SEGMENTED NEUTROPHILS RELATIVE PERCENT: 87.9 % (ref 36–66)
SODIUM BLD-SCNC: 135 MMOL/L (ref 135–145)
TOTAL IMMATURE NEUTOROPHIL: 0.04 K/CU MM
TOTAL NUCLEATED RBC: 0 K/CU MM
TRIGL SERPL-MCNC: 144 MG/DL
TROPONIN T: 0.03 NG/ML
WBC # BLD: 7.5 K/CU MM (ref 4–10.5)

## 2023-07-26 PROCEDURE — 84484 ASSAY OF TROPONIN QUANT: CPT

## 2023-07-26 PROCEDURE — 93010 ELECTROCARDIOGRAM REPORT: CPT | Performed by: INTERNAL MEDICINE

## 2023-07-26 PROCEDURE — 85379 FIBRIN DEGRADATION QUANT: CPT

## 2023-07-26 PROCEDURE — 71250 CT THORAX DX C-: CPT

## 2023-07-26 PROCEDURE — 6370000000 HC RX 637 (ALT 250 FOR IP): Performed by: INTERNAL MEDICINE

## 2023-07-26 PROCEDURE — 94761 N-INVAS EAR/PLS OXIMETRY MLT: CPT

## 2023-07-26 PROCEDURE — 6370000000 HC RX 637 (ALT 250 FOR IP): Performed by: STUDENT IN AN ORGANIZED HEALTH CARE EDUCATION/TRAINING PROGRAM

## 2023-07-26 PROCEDURE — C8929 TTE W OR WO FOL WCON,DOPPLER: HCPCS

## 2023-07-26 PROCEDURE — 2060000000 HC ICU INTERMEDIATE R&B

## 2023-07-26 PROCEDURE — 82962 GLUCOSE BLOOD TEST: CPT

## 2023-07-26 PROCEDURE — 6360000002 HC RX W HCPCS: Performed by: STUDENT IN AN ORGANIZED HEALTH CARE EDUCATION/TRAINING PROGRAM

## 2023-07-26 PROCEDURE — 6360000002 HC RX W HCPCS: Performed by: INTERNAL MEDICINE

## 2023-07-26 PROCEDURE — 83735 ASSAY OF MAGNESIUM: CPT

## 2023-07-26 PROCEDURE — 6360000004 HC RX CONTRAST MEDICATION

## 2023-07-26 PROCEDURE — 80048 BASIC METABOLIC PNL TOTAL CA: CPT

## 2023-07-26 PROCEDURE — C8923 2D TTE W OR W/O FOL W/CON,CO: HCPCS

## 2023-07-26 PROCEDURE — 94640 AIRWAY INHALATION TREATMENT: CPT

## 2023-07-26 PROCEDURE — 85025 COMPLETE CBC W/AUTO DIFF WBC: CPT

## 2023-07-26 PROCEDURE — 36600 WITHDRAWAL OF ARTERIAL BLOOD: CPT

## 2023-07-26 PROCEDURE — 6370000000 HC RX 637 (ALT 250 FOR IP): Performed by: FAMILY MEDICINE

## 2023-07-26 PROCEDURE — 82803 BLOOD GASES ANY COMBINATION: CPT

## 2023-07-26 PROCEDURE — 80061 LIPID PANEL: CPT

## 2023-07-26 PROCEDURE — 2580000003 HC RX 258

## 2023-07-26 PROCEDURE — 36415 COLL VENOUS BLD VENIPUNCTURE: CPT

## 2023-07-26 PROCEDURE — 85610 PROTHROMBIN TIME: CPT

## 2023-07-26 PROCEDURE — 2580000003 HC RX 258: Performed by: STUDENT IN AN ORGANIZED HEALTH CARE EDUCATION/TRAINING PROGRAM

## 2023-07-26 PROCEDURE — 82805 BLOOD GASES W/O2 SATURATION: CPT

## 2023-07-26 RX ORDER — CIPROFLOXACIN HYDROCHLORIDE 3.5 MG/ML
1 SOLUTION/ DROPS TOPICAL
Status: DISCONTINUED | OUTPATIENT
Start: 2023-07-26 | End: 2023-07-26 | Stop reason: ALTCHOICE

## 2023-07-26 RX ORDER — BROMFENAC 1.03 MG/ML
1 SOLUTION/ DROPS OPHTHALMIC DAILY
Status: DISCONTINUED | OUTPATIENT
Start: 2023-07-26 | End: 2023-07-30 | Stop reason: HOSPADM

## 2023-07-26 RX ORDER — OFLOXACIN 3 MG/ML
1 SOLUTION/ DROPS OPHTHALMIC 4 TIMES DAILY
Status: DISCONTINUED | OUTPATIENT
Start: 2023-07-26 | End: 2023-07-28

## 2023-07-26 RX ORDER — PREDNISOLONE ACETATE 10 MG/ML
1 SUSPENSION/ DROPS OPHTHALMIC
Status: DISCONTINUED | OUTPATIENT
Start: 2023-07-26 | End: 2023-07-26

## 2023-07-26 RX ORDER — ROSUVASTATIN CALCIUM 20 MG/1
40 TABLET, COATED ORAL NIGHTLY
Status: DISCONTINUED | OUTPATIENT
Start: 2023-07-26 | End: 2023-07-26

## 2023-07-26 RX ORDER — ROSUVASTATIN CALCIUM 5 MG/1
10 TABLET, COATED ORAL NIGHTLY
Status: DISCONTINUED | OUTPATIENT
Start: 2023-07-26 | End: 2023-07-26

## 2023-07-26 RX ORDER — PREDNISOLONE ACETATE 10 MG/ML
1 SUSPENSION/ DROPS OPHTHALMIC 4 TIMES DAILY
Status: DISCONTINUED | OUTPATIENT
Start: 2023-07-26 | End: 2023-07-28

## 2023-07-26 RX ORDER — HYDROCODONE BITARTRATE AND ACETAMINOPHEN 5; 325 MG/1; MG/1
1 TABLET ORAL
Status: COMPLETED | OUTPATIENT
Start: 2023-07-26 | End: 2023-07-26

## 2023-07-26 RX ORDER — ROSUVASTATIN CALCIUM 20 MG/1
20 TABLET, COATED ORAL NIGHTLY
Status: DISCONTINUED | OUTPATIENT
Start: 2023-07-26 | End: 2023-07-30 | Stop reason: HOSPADM

## 2023-07-26 RX ORDER — WATER 1000 ML/1000ML
INJECTION, SOLUTION INTRAVENOUS
Status: COMPLETED
Start: 2023-07-26 | End: 2023-07-26

## 2023-07-26 RX ORDER — SODIUM CHLORIDE 9 MG/ML
INJECTION INTRAVENOUS
Status: DISPENSED
Start: 2023-07-26 | End: 2023-07-27

## 2023-07-26 RX ORDER — METHYLPREDNISOLONE SODIUM SUCCINATE 40 MG/ML
40 INJECTION, POWDER, LYOPHILIZED, FOR SOLUTION INTRAMUSCULAR; INTRAVENOUS EVERY 12 HOURS
Status: DISCONTINUED | OUTPATIENT
Start: 2023-07-26 | End: 2023-07-29

## 2023-07-26 RX ORDER — IPRATROPIUM BROMIDE AND ALBUTEROL SULFATE 2.5; .5 MG/3ML; MG/3ML
1 SOLUTION RESPIRATORY (INHALATION)
Status: DISCONTINUED | OUTPATIENT
Start: 2023-07-26 | End: 2023-07-30 | Stop reason: HOSPADM

## 2023-07-26 RX ORDER — GUAIFENESIN 600 MG/1
600 TABLET, EXTENDED RELEASE ORAL 2 TIMES DAILY
Status: DISCONTINUED | OUTPATIENT
Start: 2023-07-26 | End: 2023-07-30 | Stop reason: HOSPADM

## 2023-07-26 RX ADMIN — BUDESONIDE AND FORMOTEROL FUMARATE DIHYDRATE 2 PUFF: 160; 4.5 AEROSOL RESPIRATORY (INHALATION) at 21:26

## 2023-07-26 RX ADMIN — OFLOXACIN 1 DROP: 3 SOLUTION/ DROPS OPHTHALMIC at 21:53

## 2023-07-26 RX ADMIN — SPIRONOLACTONE 25 MG: 50 TABLET ORAL at 08:34

## 2023-07-26 RX ADMIN — ENOXAPARIN SODIUM 30 MG: 100 INJECTION SUBCUTANEOUS at 21:51

## 2023-07-26 RX ADMIN — TAMSULOSIN HYDROCHLORIDE 0.4 MG: 0.4 CAPSULE ORAL at 08:34

## 2023-07-26 RX ADMIN — METHYLPREDNISOLONE SODIUM SUCCINATE 40 MG: 40 INJECTION INTRAMUSCULAR; INTRAVENOUS at 21:51

## 2023-07-26 RX ADMIN — PREDNISOLONE ACETATE 1 DROP: 10 SUSPENSION/ DROPS OPHTHALMIC at 21:53

## 2023-07-26 RX ADMIN — HYDROCODONE BITARTRATE AND ACETAMINOPHEN 1 TABLET: 5; 325 TABLET ORAL at 21:51

## 2023-07-26 RX ADMIN — FUROSEMIDE 40 MG: 10 INJECTION, SOLUTION INTRAMUSCULAR; INTRAVENOUS at 08:34

## 2023-07-26 RX ADMIN — CARVEDILOL 12.5 MG: 6.25 TABLET, FILM COATED ORAL at 18:26

## 2023-07-26 RX ADMIN — SODIUM CHLORIDE, PRESERVATIVE FREE 10 ML: 5 INJECTION INTRAVENOUS at 08:35

## 2023-07-26 RX ADMIN — ENOXAPARIN SODIUM 30 MG: 100 INJECTION SUBCUTANEOUS at 08:34

## 2023-07-26 RX ADMIN — OFLOXACIN 1 DROP: 3 SOLUTION/ DROPS OPHTHALMIC at 18:26

## 2023-07-26 RX ADMIN — WATER 10 ML: 1 INJECTION INTRAMUSCULAR; INTRAVENOUS; SUBCUTANEOUS at 13:17

## 2023-07-26 RX ADMIN — FUROSEMIDE 40 MG: 10 INJECTION, SOLUTION INTRAMUSCULAR; INTRAVENOUS at 18:26

## 2023-07-26 RX ADMIN — PERFLUTREN 4.4 ML: 6.52 INJECTION, SUSPENSION INTRAVENOUS at 14:44

## 2023-07-26 RX ADMIN — BROMFENAC OPHTHALMIC SOLUTION 0.09% 1 DROP: 1.03 SOLUTION/ DROPS OPHTHALMIC at 08:53

## 2023-07-26 RX ADMIN — GUAIFENESIN 600 MG: 600 TABLET, EXTENDED RELEASE ORAL at 21:51

## 2023-07-26 RX ADMIN — LISINOPRIL 5 MG: 5 TABLET ORAL at 08:34

## 2023-07-26 RX ADMIN — CLOPIDOGREL BISULFATE 75 MG: 75 TABLET ORAL at 08:34

## 2023-07-26 RX ADMIN — PREDNISOLONE ACETATE 1 DROP: 10 SUSPENSION/ DROPS OPHTHALMIC at 13:17

## 2023-07-26 RX ADMIN — CARVEDILOL 12.5 MG: 6.25 TABLET, FILM COATED ORAL at 08:34

## 2023-07-26 RX ADMIN — METHYLPREDNISOLONE SODIUM SUCCINATE 40 MG: 40 INJECTION INTRAMUSCULAR; INTRAVENOUS at 13:17

## 2023-07-26 RX ADMIN — IPRATROPIUM BROMIDE AND ALBUTEROL SULFATE 1 DOSE: 2.5; .5 SOLUTION RESPIRATORY (INHALATION) at 21:26

## 2023-07-26 RX ADMIN — ASPIRIN 81 MG: 81 TABLET, CHEWABLE ORAL at 08:34

## 2023-07-26 RX ADMIN — IPRATROPIUM BROMIDE AND ALBUTEROL SULFATE 1 DOSE: 2.5; .5 SOLUTION RESPIRATORY (INHALATION) at 15:52

## 2023-07-26 RX ADMIN — ROSUVASTATIN CALCIUM 20 MG: 20 TABLET, FILM COATED ORAL at 21:51

## 2023-07-26 RX ADMIN — PREDNISOLONE ACETATE 1 DROP: 10 SUSPENSION/ DROPS OPHTHALMIC at 08:54

## 2023-07-26 RX ADMIN — GUAIFENESIN 600 MG: 600 TABLET, EXTENDED RELEASE ORAL at 13:17

## 2023-07-26 ASSESSMENT — PAIN SCALES - GENERAL
PAINLEVEL_OUTOF10: 0
PAINLEVEL_OUTOF10: 0
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
## 2023-07-26 NOTE — CONSULTS
[address], [address] [place] [address]                                  CONSULTATION    PATIENT NAME: Clemencia Hickey                      :        1955  MED REC NO:   [id number]                          ROOM:         ACCOUNT NO:   [de-identified]                           ADMIT DATE: 2023  PROVIDER:     Karina Abdi MD    CONSULT DATE:  2023    HISTORY OF PRESENT ILLNESS:  The patient is a 78-year-old gentleman with  multiple medical problems including COPD, coronary artery disease,  hypertension, diabetes, obstructive sleep apnea, not compliant with  CPAP, who came to the emergency room with complaints of increasing  shortness of breath. He was also having some paroxysmal nocturnal  dyspnea and shortness of breath has been gradually getting worse over  the past few days. He is complaining of some cough productive of  whitish phlegm. He denied any hemoptysis. He denied any fever or  chills. He denied any nausea or vomiting. He denied any chest pains. In the emergency room, he had a ABGs which showed evidence of  acute-on-chronic respiratory failure with a pH of 7.26, pCO2 of 103, pO2  of 247, bicarb of 46 with 96% saturation. His chest x-ray showed  cardiomegaly, small bilateral pleural effusions. ProBNP was 9829. He  was subsequently admitted to the hospital.    PAST MEDICAL HISTORY:  Significant for COPD, obstructive sleep apnea,  not on CPAP, diabetes, hypertension, hyperlipidemia, arthritis. PAST SURGICAL HISTORY:    Remarkable for cardiac catheterization,  cholecystectomy, colonoscopy, hernia repair, joint replacement surgery,  tonsillectomy. FAMILY HISTORY:  Reveals that his mother had heart disease,  hypertension. Father had cancer. SOCIAL HISTORY:  Reveals that he quit smoking few years ago but used to  smoke a pack per day for 50 years prior to that. He drinks alcohol off  and on.   No
[id number]         Echo in am  Check ct of chest  pending     Cath-12/2019  LEFT MAIN PATENT  LAD MILD DX  LCX MID 80% TO 0% MEIR XIENCE 3.0X18MM STENT  RCA MID 90% STENOSIS --CALCIFIED VESSEL PLAN FOR FUTURE PCI    Cath --1/2020  LVEDP 20  RCA MID 90% CALCIFIED STENOSIS  ATHRECTOMY -CSI-CORONARY AND PREDILATED WITH A 2.5 BALLOON AND STENTED WITH MEIR XIENCE 3.5X28 AND 3.5X38MM STENTS   AND POST DILATED WITH A 3.75X NC BALLOON  NO COMPLICATIONS  ASA AND PLAVIX AND HEPARIN AND RIGHT RADIAL APPROACH       PAST MEDICAL HISTORY:  Includes coronary artery disease status post  angioplasty. The patient has a history of asbestos exposure, BPH, COPD,  fatty liver, kidney stones, rheumatoid arthritis, hypertension,  hyperlipidemia, diabetes. PAST SURGICAL HISTORY:  Bilateral hip replacement, cholecystectomy, CAD  status post PCI.       Electronically signed by Sanam Alarcon MD on 7/25/23 at 10:46 PM EDT
Kyaw Landa [id number]
Nutrition Note    RD consulted for diet education. Hx DM, HF. Increased O2 levels dos, off and on bipap. At visit, pt sleeping on bipap. No appropriate for diet education. Will reassess and attempt again for education during admission.      Electronically signed by Royal Robert RD, LD on 7/26/23 at 3:59 PM EDT    Contact: [phone]
artery. SOCIAL HISTORY:  He does not smoke. He does not drink. ALLERGIES:  NKDA. PHYSICAL EXAMINATION:  GENERAL:  The patient is awake, alert, and answering questions. VITAL SIGNS:  Temperature is afebrile. Pulse is 80. Blood pressure is  153/87. Sinus rhythm present. HEENT:  Head is asymptomatic. Pupils are equal and reactive. CHEST:  Equal expansion. LUNGS:  Clear to auscultation. No wheezing or rhonchi present. Decreased breath sounds present. HEART:  Regular rhythm. ABDOMEN:  Soft and nontender. Bowel sounds are present. EXTREMITIES:  2+ edema present. LABORATORY DATA:  BUN is 25, creatinine is 1.1. BNP is 9800. Troponin  is elevated, not positive. LFTs are normal.  CBC is within normal  range. DIAGNOSTIC DATA:  Chest x-ray has small bilateral pleural effusion  noted. CT of the chest without contrast is pending. IMPRESSION AND PLAN:  This is a 64-year-old male patient with a history  of having coronary artery disease, status post angioplasty done, comes  in with shortness of breath present. CT of the chest is pending, we  will wait for that. At this time, from a cardiac stand, EKG showed  sinus rhythm with right bundle-branch block present. No acute ST  changes noted. We will obtain an echo, we will make further  recommendation based on the hospital course. We will follow the patient  along with you.         Conor Walton MD    D: 07/25/2023 21:24:12       T: 07/26/2023 1:14:49     [id number]  Job#: [id number]     Doc#: [id number]    CC:

## 2023-07-27 LAB
ALBUMIN SERPL-MCNC: 2.7 GM/DL (ref 3.4–5)
ALP BLD-CCNC: 102 IU/L (ref 40–128)
ALT SERPL-CCNC: 22 U/L (ref 10–40)
ANION GAP SERPL CALCULATED.3IONS-SCNC: 8 MMOL/L (ref 4–16)
AST SERPL-CCNC: 22 IU/L (ref 15–37)
BASE EXCESS MIXED: 10.1 (ref 0–1.2)
BASE EXCESS MIXED: 12.3 (ref 0–1.2)
BASOPHILS ABSOLUTE: 0 K/CU MM
BASOPHILS RELATIVE PERCENT: 0.2 % (ref 0–1)
BILIRUB SERPL-MCNC: 0.3 MG/DL (ref 0–1)
BUN SERPL-MCNC: 30 MG/DL (ref 6–23)
CALCIUM SERPL-MCNC: 8.9 MG/DL (ref 8.3–10.6)
CARBON MONOXIDE, BLOOD: 2.4 % (ref 0–5)
CHLORIDE BLD-SCNC: 95 MMOL/L (ref 99–110)
CO2 CONTENT: 40.5 MMOL/L (ref 19–24)
CO2: 34 MMOL/L (ref 21–32)
COMMENT: ABNORMAL
COMMENT: ABNORMAL
CREAT SERPL-MCNC: 1.3 MG/DL (ref 0.9–1.3)
D DIMER: 0.94 UG/ML (FEU)
DIFFERENTIAL TYPE: ABNORMAL
EOSINOPHILS ABSOLUTE: 0 K/CU MM
EOSINOPHILS RELATIVE PERCENT: 0 % (ref 0–3)
GFR SERPL CREATININE-BSD FRML MDRD: 59 ML/MIN/1.73M2
GLUCOSE BLD-MCNC: 163 MG/DL (ref 70–99)
GLUCOSE BLD-MCNC: 166 MG/DL (ref 70–99)
GLUCOSE BLD-MCNC: 179 MG/DL (ref 70–99)
GLUCOSE BLD-MCNC: 210 MG/DL (ref 70–99)
GLUCOSE SERPL-MCNC: 180 MG/DL (ref 70–99)
HCO3 ARTERIAL: 38.4 MMOL/L (ref 18–23)
HCO3 VENOUS: 41.4 MMOL/L (ref 19–25)
HCT VFR BLD CALC: 45.6 % (ref 42–52)
HEMOGLOBIN: 14.4 GM/DL (ref 13.5–18)
IMMATURE NEUTROPHIL %: 0.7 % (ref 0–0.43)
LYMPHOCYTES ABSOLUTE: 0.7 K/CU MM
LYMPHOCYTES RELATIVE PERCENT: 11.4 % (ref 24–44)
MAGNESIUM: 2.1 MG/DL (ref 1.8–2.4)
MCH RBC QN AUTO: 29.1 PG (ref 27–31)
MCHC RBC AUTO-ENTMCNC: 31.6 % (ref 32–36)
MCV RBC AUTO: 92.1 FL (ref 78–100)
METHEMOGLOBIN ARTERIAL: 1.3 %
MONOCYTES ABSOLUTE: 0.2 K/CU MM
MONOCYTES RELATIVE PERCENT: 2.8 % (ref 0–4)
NUCLEATED RBC %: 0 %
O2 SAT, VEN: 95 % (ref 50–70)
O2 SATURATION: 94 % (ref 96–97)
PCO2 ARTERIAL: 68 MMHG (ref 32–45)
PCO2, VEN: 75 MMHG (ref 38–52)
PDW BLD-RTO: 12.8 % (ref 11.7–14.9)
PH BLOOD: 7.36 (ref 7.34–7.45)
PH VENOUS: 7.35 (ref 7.32–7.42)
PHOSPHORUS: 4.3 MG/DL (ref 2.5–4.9)
PLATELET # BLD: 195 K/CU MM (ref 140–440)
PMV BLD AUTO: 9.6 FL (ref 7.5–11.1)
PO2 ARTERIAL: 86 MMHG (ref 75–100)
PO2, VEN: 180 MMHG (ref 28–48)
POTASSIUM SERPL-SCNC: 4.3 MMOL/L (ref 3.5–5.1)
PRO-BNP: 3433 PG/ML
PROCALCITONIN SERPL-MCNC: 0.1 NG/ML
RBC # BLD: 4.95 M/CU MM (ref 4.6–6.2)
SEGMENTED NEUTROPHILS ABSOLUTE COUNT: 4.8 K/CU MM
SEGMENTED NEUTROPHILS RELATIVE PERCENT: 84.9 % (ref 36–66)
SODIUM BLD-SCNC: 137 MMOL/L (ref 135–145)
TOTAL IMMATURE NEUTOROPHIL: 0.04 K/CU MM
TOTAL NUCLEATED RBC: 0 K/CU MM
TOTAL PROTEIN: 4.7 GM/DL (ref 6.4–8.2)
WBC # BLD: 5.7 K/CU MM (ref 4–10.5)

## 2023-07-27 PROCEDURE — 94640 AIRWAY INHALATION TREATMENT: CPT

## 2023-07-27 PROCEDURE — 36415 COLL VENOUS BLD VENIPUNCTURE: CPT

## 2023-07-27 PROCEDURE — 6360000002 HC RX W HCPCS: Performed by: INTERNAL MEDICINE

## 2023-07-27 PROCEDURE — 6360000002 HC RX W HCPCS: Performed by: STUDENT IN AN ORGANIZED HEALTH CARE EDUCATION/TRAINING PROGRAM

## 2023-07-27 PROCEDURE — 36600 WITHDRAWAL OF ARTERIAL BLOOD: CPT

## 2023-07-27 PROCEDURE — 94761 N-INVAS EAR/PLS OXIMETRY MLT: CPT

## 2023-07-27 PROCEDURE — 83880 ASSAY OF NATRIURETIC PEPTIDE: CPT

## 2023-07-27 PROCEDURE — 2060000000 HC ICU INTERMEDIATE R&B

## 2023-07-27 PROCEDURE — 6370000000 HC RX 637 (ALT 250 FOR IP): Performed by: STUDENT IN AN ORGANIZED HEALTH CARE EDUCATION/TRAINING PROGRAM

## 2023-07-27 PROCEDURE — 84100 ASSAY OF PHOSPHORUS: CPT

## 2023-07-27 PROCEDURE — 83735 ASSAY OF MAGNESIUM: CPT

## 2023-07-27 PROCEDURE — 82803 BLOOD GASES ANY COMBINATION: CPT

## 2023-07-27 PROCEDURE — 6370000000 HC RX 637 (ALT 250 FOR IP): Performed by: INTERNAL MEDICINE

## 2023-07-27 PROCEDURE — 82805 BLOOD GASES W/O2 SATURATION: CPT

## 2023-07-27 PROCEDURE — 94660 CPAP INITIATION&MGMT: CPT

## 2023-07-27 PROCEDURE — 84145 PROCALCITONIN (PCT): CPT

## 2023-07-27 PROCEDURE — 2700000000 HC OXYGEN THERAPY PER DAY

## 2023-07-27 PROCEDURE — 2580000003 HC RX 258: Performed by: STUDENT IN AN ORGANIZED HEALTH CARE EDUCATION/TRAINING PROGRAM

## 2023-07-27 PROCEDURE — 80053 COMPREHEN METABOLIC PANEL: CPT

## 2023-07-27 PROCEDURE — 6370000000 HC RX 637 (ALT 250 FOR IP): Performed by: PHYSICIAN ASSISTANT

## 2023-07-27 PROCEDURE — 85025 COMPLETE CBC W/AUTO DIFF WBC: CPT

## 2023-07-27 PROCEDURE — 82962 GLUCOSE BLOOD TEST: CPT

## 2023-07-27 RX ORDER — HYDROCODONE BITARTRATE AND ACETAMINOPHEN 5; 325 MG/1; MG/1
1 TABLET ORAL EVERY 6 HOURS PRN
Status: DISCONTINUED | OUTPATIENT
Start: 2023-07-27 | End: 2023-07-30 | Stop reason: HOSPADM

## 2023-07-27 RX ORDER — TORSEMIDE 20 MG/1
20 TABLET ORAL DAILY
Status: DISCONTINUED | OUTPATIENT
Start: 2023-07-27 | End: 2023-07-30 | Stop reason: HOSPADM

## 2023-07-27 RX ADMIN — GUAIFENESIN 600 MG: 600 TABLET, EXTENDED RELEASE ORAL at 08:00

## 2023-07-27 RX ADMIN — GUAIFENESIN 600 MG: 600 TABLET, EXTENDED RELEASE ORAL at 20:51

## 2023-07-27 RX ADMIN — OFLOXACIN 1 DROP: 3 SOLUTION/ DROPS OPHTHALMIC at 17:18

## 2023-07-27 RX ADMIN — OFLOXACIN 1 DROP: 3 SOLUTION/ DROPS OPHTHALMIC at 08:02

## 2023-07-27 RX ADMIN — TAMSULOSIN HYDROCHLORIDE 0.4 MG: 0.4 CAPSULE ORAL at 08:00

## 2023-07-27 RX ADMIN — BUDESONIDE AND FORMOTEROL FUMARATE DIHYDRATE 2 PUFF: 160; 4.5 AEROSOL RESPIRATORY (INHALATION) at 22:48

## 2023-07-27 RX ADMIN — SODIUM CHLORIDE, PRESERVATIVE FREE 10 ML: 5 INJECTION INTRAVENOUS at 20:56

## 2023-07-27 RX ADMIN — OFLOXACIN 1 DROP: 3 SOLUTION/ DROPS OPHTHALMIC at 11:37

## 2023-07-27 RX ADMIN — IPRATROPIUM BROMIDE AND ALBUTEROL SULFATE 1 DOSE: 2.5; .5 SOLUTION RESPIRATORY (INHALATION) at 12:07

## 2023-07-27 RX ADMIN — SPIRONOLACTONE 25 MG: 50 TABLET ORAL at 07:59

## 2023-07-27 RX ADMIN — PREDNISOLONE ACETATE 1 DROP: 10 SUSPENSION/ DROPS OPHTHALMIC at 11:37

## 2023-07-27 RX ADMIN — CARVEDILOL 12.5 MG: 6.25 TABLET, FILM COATED ORAL at 17:17

## 2023-07-27 RX ADMIN — ENOXAPARIN SODIUM 30 MG: 100 INJECTION SUBCUTANEOUS at 08:00

## 2023-07-27 RX ADMIN — IPRATROPIUM BROMIDE AND ALBUTEROL SULFATE 1 DOSE: 2.5; .5 SOLUTION RESPIRATORY (INHALATION) at 22:48

## 2023-07-27 RX ADMIN — CLOPIDOGREL BISULFATE 75 MG: 75 TABLET ORAL at 08:00

## 2023-07-27 RX ADMIN — IPRATROPIUM BROMIDE AND ALBUTEROL SULFATE 1 DOSE: 2.5; .5 SOLUTION RESPIRATORY (INHALATION) at 15:37

## 2023-07-27 RX ADMIN — BROMFENAC OPHTHALMIC SOLUTION 0.09% 1 DROP: 1.03 SOLUTION/ DROPS OPHTHALMIC at 08:02

## 2023-07-27 RX ADMIN — ROSUVASTATIN CALCIUM 20 MG: 20 TABLET, FILM COATED ORAL at 20:51

## 2023-07-27 RX ADMIN — PREDNISOLONE ACETATE 1 DROP: 10 SUSPENSION/ DROPS OPHTHALMIC at 20:52

## 2023-07-27 RX ADMIN — EMPAGLIFLOZIN 10 MG: 10 TABLET, FILM COATED ORAL at 12:27

## 2023-07-27 RX ADMIN — FUROSEMIDE 40 MG: 10 INJECTION, SOLUTION INTRAMUSCULAR; INTRAVENOUS at 08:00

## 2023-07-27 RX ADMIN — ASPIRIN 81 MG: 81 TABLET, CHEWABLE ORAL at 08:00

## 2023-07-27 RX ADMIN — METHYLPREDNISOLONE SODIUM SUCCINATE 40 MG: 40 INJECTION INTRAMUSCULAR; INTRAVENOUS at 11:35

## 2023-07-27 RX ADMIN — INSULIN LISPRO 1 UNITS: 100 INJECTION, SOLUTION INTRAVENOUS; SUBCUTANEOUS at 11:52

## 2023-07-27 RX ADMIN — PREDNISOLONE ACETATE 1 DROP: 10 SUSPENSION/ DROPS OPHTHALMIC at 17:17

## 2023-07-27 RX ADMIN — PREDNISOLONE ACETATE 1 DROP: 10 SUSPENSION/ DROPS OPHTHALMIC at 08:02

## 2023-07-27 RX ADMIN — ENOXAPARIN SODIUM 30 MG: 100 INJECTION SUBCUTANEOUS at 20:52

## 2023-07-27 RX ADMIN — HYDROCODONE BITARTRATE AND ACETAMINOPHEN 1 TABLET: 5; 325 TABLET ORAL at 18:47

## 2023-07-27 RX ADMIN — OFLOXACIN 1 DROP: 3 SOLUTION/ DROPS OPHTHALMIC at 20:52

## 2023-07-27 RX ADMIN — CARVEDILOL 12.5 MG: 6.25 TABLET, FILM COATED ORAL at 08:00

## 2023-07-27 RX ADMIN — HYDROCODONE BITARTRATE AND ACETAMINOPHEN 1 TABLET: 5; 325 TABLET ORAL at 12:27

## 2023-07-27 RX ADMIN — LISINOPRIL 5 MG: 5 TABLET ORAL at 08:00

## 2023-07-27 RX ADMIN — BUDESONIDE AND FORMOTEROL FUMARATE DIHYDRATE 2 PUFF: 160; 4.5 AEROSOL RESPIRATORY (INHALATION) at 08:22

## 2023-07-27 RX ADMIN — IPRATROPIUM BROMIDE AND ALBUTEROL SULFATE 1 DOSE: 2.5; .5 SOLUTION RESPIRATORY (INHALATION) at 08:22

## 2023-07-27 RX ADMIN — TORSEMIDE 20 MG: 20 TABLET ORAL at 15:19

## 2023-07-27 ASSESSMENT — PAIN SCALES - GENERAL
PAINLEVEL_OUTOF10: 0
PAINLEVEL_OUTOF10: 0
PAINLEVEL_OUTOF10: 3
PAINLEVEL_OUTOF10: 8
PAINLEVEL_OUTOF10: 8
PAINLEVEL_OUTOF10: 9
PAINLEVEL_OUTOF10: 6

## 2023-07-27 ASSESSMENT — PAIN DESCRIPTION - LOCATION
LOCATION: SHOULDER
LOCATION: SHOULDER

## 2023-07-27 NOTE — CARE COORDINATION
Attempted to meet with patient. He is on Bipap and sleeping soundly. Will revisit when patient can participate. Carmen Hopkins RN     Chart reviewed. Patient lives at home; appears independent. He has a PCP and insurance that   Assists with Rx when needed. CM will follow for any discharge needs.  Carmen Hopkins RN

## 2023-07-28 LAB
ALBUMIN SERPL-MCNC: 2.8 GM/DL (ref 3.4–5)
ALP BLD-CCNC: 94 IU/L (ref 40–128)
ALT SERPL-CCNC: 23 U/L (ref 10–40)
ANION GAP SERPL CALCULATED.3IONS-SCNC: 7 MMOL/L (ref 4–16)
AST SERPL-CCNC: 20 IU/L (ref 15–37)
BASE EXCESS MIXED: 9.7 (ref 0–1.2)
BASOPHILS ABSOLUTE: 0 K/CU MM
BASOPHILS RELATIVE PERCENT: 0.1 % (ref 0–1)
BILIRUB SERPL-MCNC: 0.3 MG/DL (ref 0–1)
BUN SERPL-MCNC: 35 MG/DL (ref 6–23)
CALCIUM SERPL-MCNC: 8.9 MG/DL (ref 8.3–10.6)
CHLORIDE BLD-SCNC: 94 MMOL/L (ref 99–110)
CO2: 34 MMOL/L (ref 21–32)
COMMENT: ABNORMAL
CREAT SERPL-MCNC: 1.3 MG/DL (ref 0.9–1.3)
DIFFERENTIAL TYPE: ABNORMAL
EOSINOPHILS ABSOLUTE: 0 K/CU MM
EOSINOPHILS RELATIVE PERCENT: 0 % (ref 0–3)
GFR SERPL CREATININE-BSD FRML MDRD: 59 ML/MIN/1.73M2
GLUCOSE BLD-MCNC: 139 MG/DL (ref 70–99)
GLUCOSE BLD-MCNC: 217 MG/DL (ref 70–99)
GLUCOSE BLD-MCNC: 220 MG/DL (ref 70–99)
GLUCOSE BLD-MCNC: 298 MG/DL (ref 70–99)
GLUCOSE SERPL-MCNC: 132 MG/DL (ref 70–99)
HCO3 VENOUS: 38.1 MMOL/L (ref 19–25)
HCT VFR BLD CALC: 44.7 % (ref 42–52)
HEMOGLOBIN: 13.9 GM/DL (ref 13.5–18)
IMMATURE NEUTROPHIL %: 0.9 % (ref 0–0.43)
LYMPHOCYTES ABSOLUTE: 0.8 K/CU MM
LYMPHOCYTES RELATIVE PERCENT: 10.1 % (ref 24–44)
MAGNESIUM: 2.2 MG/DL (ref 1.8–2.4)
MCH RBC QN AUTO: 28.8 PG (ref 27–31)
MCHC RBC AUTO-ENTMCNC: 31.1 % (ref 32–36)
MCV RBC AUTO: 92.5 FL (ref 78–100)
MONOCYTES ABSOLUTE: 0.5 K/CU MM
MONOCYTES RELATIVE PERCENT: 6.5 % (ref 0–4)
NUCLEATED RBC %: 0 %
O2 SAT, VEN: 91.7 % (ref 50–70)
PCO2, VEN: 69 MMHG (ref 38–52)
PDW BLD-RTO: 12.8 % (ref 11.7–14.9)
PH VENOUS: 7.35 (ref 7.32–7.42)
PLATELET # BLD: 208 K/CU MM (ref 140–440)
PMV BLD AUTO: 9.3 FL (ref 7.5–11.1)
PO2, VEN: 78 MMHG (ref 28–48)
POTASSIUM SERPL-SCNC: 4.5 MMOL/L (ref 3.5–5.1)
RBC # BLD: 4.83 M/CU MM (ref 4.6–6.2)
SEGMENTED NEUTROPHILS ABSOLUTE COUNT: 6.1 K/CU MM
SEGMENTED NEUTROPHILS RELATIVE PERCENT: 82.4 % (ref 36–66)
SODIUM BLD-SCNC: 135 MMOL/L (ref 135–145)
TOTAL IMMATURE NEUTOROPHIL: 0.07 K/CU MM
TOTAL NUCLEATED RBC: 0 K/CU MM
TOTAL PROTEIN: 4.7 GM/DL (ref 6.4–8.2)
WBC # BLD: 7.4 K/CU MM (ref 4–10.5)

## 2023-07-28 PROCEDURE — 94761 N-INVAS EAR/PLS OXIMETRY MLT: CPT

## 2023-07-28 PROCEDURE — 6370000000 HC RX 637 (ALT 250 FOR IP): Performed by: INTERNAL MEDICINE

## 2023-07-28 PROCEDURE — 6360000002 HC RX W HCPCS: Performed by: INTERNAL MEDICINE

## 2023-07-28 PROCEDURE — 80053 COMPREHEN METABOLIC PANEL: CPT

## 2023-07-28 PROCEDURE — 94664 DEMO&/EVAL PT USE INHALER: CPT

## 2023-07-28 PROCEDURE — 97162 PT EVAL MOD COMPLEX 30 MIN: CPT

## 2023-07-28 PROCEDURE — 6360000002 HC RX W HCPCS: Performed by: STUDENT IN AN ORGANIZED HEALTH CARE EDUCATION/TRAINING PROGRAM

## 2023-07-28 PROCEDURE — 97530 THERAPEUTIC ACTIVITIES: CPT

## 2023-07-28 PROCEDURE — 6370000000 HC RX 637 (ALT 250 FOR IP): Performed by: STUDENT IN AN ORGANIZED HEALTH CARE EDUCATION/TRAINING PROGRAM

## 2023-07-28 PROCEDURE — 85025 COMPLETE CBC W/AUTO DIFF WBC: CPT

## 2023-07-28 PROCEDURE — 94618 PULMONARY STRESS TESTING: CPT

## 2023-07-28 PROCEDURE — 6370000000 HC RX 637 (ALT 250 FOR IP): Performed by: PHYSICIAN ASSISTANT

## 2023-07-28 PROCEDURE — 2060000000 HC ICU INTERMEDIATE R&B

## 2023-07-28 PROCEDURE — 2700000000 HC OXYGEN THERAPY PER DAY

## 2023-07-28 PROCEDURE — 6370000000 HC RX 637 (ALT 250 FOR IP): Performed by: FAMILY MEDICINE

## 2023-07-28 PROCEDURE — 82805 BLOOD GASES W/O2 SATURATION: CPT

## 2023-07-28 PROCEDURE — 83735 ASSAY OF MAGNESIUM: CPT

## 2023-07-28 PROCEDURE — 97166 OT EVAL MOD COMPLEX 45 MIN: CPT

## 2023-07-28 PROCEDURE — 94640 AIRWAY INHALATION TREATMENT: CPT

## 2023-07-28 PROCEDURE — 94660 CPAP INITIATION&MGMT: CPT

## 2023-07-28 PROCEDURE — 2580000003 HC RX 258: Performed by: STUDENT IN AN ORGANIZED HEALTH CARE EDUCATION/TRAINING PROGRAM

## 2023-07-28 PROCEDURE — 97116 GAIT TRAINING THERAPY: CPT

## 2023-07-28 PROCEDURE — 82962 GLUCOSE BLOOD TEST: CPT

## 2023-07-28 PROCEDURE — 36415 COLL VENOUS BLD VENIPUNCTURE: CPT

## 2023-07-28 RX ORDER — MENTHOL 1.4 %
ADHESIVE PATCH, MEDICATED TOPICAL 3 TIMES DAILY PRN
Status: DISCONTINUED | OUTPATIENT
Start: 2023-07-28 | End: 2023-07-30 | Stop reason: HOSPADM

## 2023-07-28 RX ORDER — PREDNISOLONE ACETATE 10 MG/ML
1 SUSPENSION/ DROPS OPHTHALMIC 3 TIMES DAILY
Status: DISCONTINUED | OUTPATIENT
Start: 2023-07-28 | End: 2023-07-30 | Stop reason: HOSPADM

## 2023-07-28 RX ADMIN — INSULIN LISPRO 1 UNITS: 100 INJECTION, SOLUTION INTRAVENOUS; SUBCUTANEOUS at 11:33

## 2023-07-28 RX ADMIN — IPRATROPIUM BROMIDE AND ALBUTEROL SULFATE 1 DOSE: 2.5; .5 SOLUTION RESPIRATORY (INHALATION) at 08:29

## 2023-07-28 RX ADMIN — IPRATROPIUM BROMIDE AND ALBUTEROL SULFATE 1 DOSE: 2.5; .5 SOLUTION RESPIRATORY (INHALATION) at 11:38

## 2023-07-28 RX ADMIN — HYDROCODONE BITARTRATE AND ACETAMINOPHEN 1 TABLET: 5; 325 TABLET ORAL at 08:55

## 2023-07-28 RX ADMIN — CLOPIDOGREL BISULFATE 75 MG: 75 TABLET ORAL at 08:40

## 2023-07-28 RX ADMIN — METHYLPREDNISOLONE SODIUM SUCCINATE 40 MG: 40 INJECTION INTRAMUSCULAR; INTRAVENOUS at 23:11

## 2023-07-28 RX ADMIN — ROSUVASTATIN CALCIUM 20 MG: 20 TABLET, FILM COATED ORAL at 20:38

## 2023-07-28 RX ADMIN — INSULIN LISPRO 2 UNITS: 100 INJECTION, SOLUTION INTRAVENOUS; SUBCUTANEOUS at 08:55

## 2023-07-28 RX ADMIN — GUAIFENESIN 600 MG: 600 TABLET, EXTENDED RELEASE ORAL at 08:40

## 2023-07-28 RX ADMIN — IPRATROPIUM BROMIDE AND ALBUTEROL SULFATE 1 DOSE: 2.5; .5 SOLUTION RESPIRATORY (INHALATION) at 15:57

## 2023-07-28 RX ADMIN — PREDNISOLONE ACETATE 1 DROP: 10 SUSPENSION/ DROPS OPHTHALMIC at 17:08

## 2023-07-28 RX ADMIN — MENTHOL, METHYL SALICYLATE: 10; 15 CREAM TOPICAL at 23:11

## 2023-07-28 RX ADMIN — TAMSULOSIN HYDROCHLORIDE 0.4 MG: 0.4 CAPSULE ORAL at 08:40

## 2023-07-28 RX ADMIN — PREDNISOLONE ACETATE 1 DROP: 10 SUSPENSION/ DROPS OPHTHALMIC at 08:41

## 2023-07-28 RX ADMIN — BUDESONIDE AND FORMOTEROL FUMARATE DIHYDRATE 2 PUFF: 160; 4.5 AEROSOL RESPIRATORY (INHALATION) at 21:42

## 2023-07-28 RX ADMIN — HYDROCODONE BITARTRATE AND ACETAMINOPHEN 1 TABLET: 5; 325 TABLET ORAL at 00:02

## 2023-07-28 RX ADMIN — TORSEMIDE 20 MG: 20 TABLET ORAL at 11:34

## 2023-07-28 RX ADMIN — EMPAGLIFLOZIN 10 MG: 10 TABLET, FILM COATED ORAL at 08:40

## 2023-07-28 RX ADMIN — CARVEDILOL 12.5 MG: 6.25 TABLET, FILM COATED ORAL at 08:40

## 2023-07-28 RX ADMIN — HYDROCODONE BITARTRATE AND ACETAMINOPHEN 1 TABLET: 5; 325 TABLET ORAL at 20:37

## 2023-07-28 RX ADMIN — METHYLPREDNISOLONE SODIUM SUCCINATE 40 MG: 40 INJECTION INTRAMUSCULAR; INTRAVENOUS at 00:02

## 2023-07-28 RX ADMIN — SPIRONOLACTONE 25 MG: 50 TABLET ORAL at 08:40

## 2023-07-28 RX ADMIN — IPRATROPIUM BROMIDE AND ALBUTEROL SULFATE 1 DOSE: 2.5; .5 SOLUTION RESPIRATORY (INHALATION) at 21:42

## 2023-07-28 RX ADMIN — BUDESONIDE AND FORMOTEROL FUMARATE DIHYDRATE 2 PUFF: 160; 4.5 AEROSOL RESPIRATORY (INHALATION) at 08:27

## 2023-07-28 RX ADMIN — ENOXAPARIN SODIUM 30 MG: 100 INJECTION SUBCUTANEOUS at 20:38

## 2023-07-28 RX ADMIN — PREDNISOLONE ACETATE 1 DROP: 10 SUSPENSION/ DROPS OPHTHALMIC at 20:38

## 2023-07-28 RX ADMIN — CARVEDILOL 12.5 MG: 6.25 TABLET, FILM COATED ORAL at 17:08

## 2023-07-28 RX ADMIN — BROMFENAC OPHTHALMIC SOLUTION 0.09% 1 DROP: 1.03 SOLUTION/ DROPS OPHTHALMIC at 08:41

## 2023-07-28 RX ADMIN — METHYLPREDNISOLONE SODIUM SUCCINATE 40 MG: 40 INJECTION INTRAMUSCULAR; INTRAVENOUS at 11:34

## 2023-07-28 RX ADMIN — SODIUM CHLORIDE, PRESERVATIVE FREE 10 ML: 5 INJECTION INTRAVENOUS at 20:38

## 2023-07-28 RX ADMIN — HYDROCODONE BITARTRATE AND ACETAMINOPHEN 1 TABLET: 5; 325 TABLET ORAL at 14:35

## 2023-07-28 RX ADMIN — GUAIFENESIN 600 MG: 600 TABLET, EXTENDED RELEASE ORAL at 20:37

## 2023-07-28 RX ADMIN — ENOXAPARIN SODIUM 30 MG: 100 INJECTION SUBCUTANEOUS at 08:40

## 2023-07-28 RX ADMIN — ASPIRIN 81 MG: 81 TABLET, CHEWABLE ORAL at 08:40

## 2023-07-28 ASSESSMENT — PAIN SCALES - GENERAL
PAINLEVEL_OUTOF10: 3
PAINLEVEL_OUTOF10: 9
PAINLEVEL_OUTOF10: 9
PAINLEVEL_OUTOF10: 5
PAINLEVEL_OUTOF10: 9
PAINLEVEL_OUTOF10: 0

## 2023-07-28 ASSESSMENT — PAIN DESCRIPTION - LOCATION
LOCATION: SHOULDER

## 2023-07-28 ASSESSMENT — PAIN DESCRIPTION - ORIENTATION
ORIENTATION: LEFT

## 2023-07-29 LAB
ALBUMIN SERPL-MCNC: 3.4 GM/DL (ref 3.4–5)
ALP BLD-CCNC: 98 IU/L (ref 40–129)
ALT SERPL-CCNC: 26 U/L (ref 10–40)
ANION GAP SERPL CALCULATED.3IONS-SCNC: 10 MMOL/L (ref 4–16)
AST SERPL-CCNC: 20 IU/L (ref 15–37)
BASOPHILS ABSOLUTE: 0 K/CU MM
BASOPHILS RELATIVE PERCENT: 0.1 % (ref 0–1)
BILIRUB SERPL-MCNC: 0.4 MG/DL (ref 0–1)
BUN SERPL-MCNC: 39 MG/DL (ref 6–23)
CALCIUM SERPL-MCNC: 9.2 MG/DL (ref 8.3–10.6)
CHLORIDE BLD-SCNC: 97 MMOL/L (ref 99–110)
CO2: 31 MMOL/L (ref 21–32)
CREAT SERPL-MCNC: 1.1 MG/DL (ref 0.9–1.3)
DIFFERENTIAL TYPE: ABNORMAL
EOSINOPHILS ABSOLUTE: 0 K/CU MM
EOSINOPHILS RELATIVE PERCENT: 0 % (ref 0–3)
GFR SERPL CREATININE-BSD FRML MDRD: >60 ML/MIN/1.73M2
GLUCOSE BLD-MCNC: 137 MG/DL (ref 70–99)
GLUCOSE BLD-MCNC: 183 MG/DL (ref 70–99)
GLUCOSE SERPL-MCNC: 156 MG/DL (ref 70–99)
HCT VFR BLD CALC: 48.9 % (ref 42–52)
HEMOGLOBIN: 15.1 GM/DL (ref 13.5–18)
IMMATURE NEUTROPHIL %: 0.9 % (ref 0–0.43)
LYMPHOCYTES ABSOLUTE: 0.6 K/CU MM
LYMPHOCYTES RELATIVE PERCENT: 7.3 % (ref 24–44)
MAGNESIUM: 2.6 MG/DL (ref 1.8–2.4)
MCH RBC QN AUTO: 28.6 PG (ref 27–31)
MCHC RBC AUTO-ENTMCNC: 30.9 % (ref 32–36)
MCV RBC AUTO: 92.6 FL (ref 78–100)
MONOCYTES ABSOLUTE: 0.3 K/CU MM
MONOCYTES RELATIVE PERCENT: 3.8 % (ref 0–4)
NUCLEATED RBC %: 0 %
PDW BLD-RTO: 12.9 % (ref 11.7–14.9)
PLATELET # BLD: 208 K/CU MM (ref 140–440)
PMV BLD AUTO: 9.2 FL (ref 7.5–11.1)
POTASSIUM SERPL-SCNC: 5 MMOL/L (ref 3.5–5.1)
RBC # BLD: 5.28 M/CU MM (ref 4.6–6.2)
SEGMENTED NEUTROPHILS ABSOLUTE COUNT: 7.1 K/CU MM
SEGMENTED NEUTROPHILS RELATIVE PERCENT: 87.9 % (ref 36–66)
SODIUM BLD-SCNC: 138 MMOL/L (ref 135–145)
TOTAL IMMATURE NEUTOROPHIL: 0.07 K/CU MM
TOTAL NUCLEATED RBC: 0 K/CU MM
TOTAL PROTEIN: 5.5 GM/DL (ref 6.4–8.2)
WBC # BLD: 8.1 K/CU MM (ref 4–10.5)

## 2023-07-29 PROCEDURE — 6370000000 HC RX 637 (ALT 250 FOR IP): Performed by: INTERNAL MEDICINE

## 2023-07-29 PROCEDURE — 36415 COLL VENOUS BLD VENIPUNCTURE: CPT

## 2023-07-29 PROCEDURE — 94761 N-INVAS EAR/PLS OXIMETRY MLT: CPT

## 2023-07-29 PROCEDURE — 6370000000 HC RX 637 (ALT 250 FOR IP): Performed by: STUDENT IN AN ORGANIZED HEALTH CARE EDUCATION/TRAINING PROGRAM

## 2023-07-29 PROCEDURE — 6370000000 HC RX 637 (ALT 250 FOR IP): Performed by: PHYSICIAN ASSISTANT

## 2023-07-29 PROCEDURE — 94660 CPAP INITIATION&MGMT: CPT

## 2023-07-29 PROCEDURE — 2580000003 HC RX 258: Performed by: STUDENT IN AN ORGANIZED HEALTH CARE EDUCATION/TRAINING PROGRAM

## 2023-07-29 PROCEDURE — 6360000002 HC RX W HCPCS: Performed by: STUDENT IN AN ORGANIZED HEALTH CARE EDUCATION/TRAINING PROGRAM

## 2023-07-29 PROCEDURE — 82962 GLUCOSE BLOOD TEST: CPT

## 2023-07-29 PROCEDURE — 2700000000 HC OXYGEN THERAPY PER DAY

## 2023-07-29 PROCEDURE — 83735 ASSAY OF MAGNESIUM: CPT

## 2023-07-29 PROCEDURE — 2060000000 HC ICU INTERMEDIATE R&B

## 2023-07-29 PROCEDURE — 80053 COMPREHEN METABOLIC PANEL: CPT

## 2023-07-29 PROCEDURE — 85025 COMPLETE CBC W/AUTO DIFF WBC: CPT

## 2023-07-29 PROCEDURE — 94640 AIRWAY INHALATION TREATMENT: CPT

## 2023-07-29 PROCEDURE — 2500000003 HC RX 250 WO HCPCS: Performed by: INTERNAL MEDICINE

## 2023-07-29 RX ORDER — METHYLPREDNISOLONE SODIUM SUCCINATE 40 MG/ML
40 INJECTION, POWDER, LYOPHILIZED, FOR SOLUTION INTRAMUSCULAR; INTRAVENOUS DAILY
Status: DISCONTINUED | OUTPATIENT
Start: 2023-07-29 | End: 2023-07-30 | Stop reason: HOSPADM

## 2023-07-29 RX ORDER — BUMETANIDE 0.25 MG/ML
2 INJECTION INTRAMUSCULAR; INTRAVENOUS ONCE
Status: COMPLETED | OUTPATIENT
Start: 2023-07-29 | End: 2023-07-29

## 2023-07-29 RX ORDER — CARVEDILOL 25 MG/1
25 TABLET ORAL 2 TIMES DAILY WITH MEALS
Status: DISCONTINUED | OUTPATIENT
Start: 2023-07-29 | End: 2023-07-30 | Stop reason: HOSPADM

## 2023-07-29 RX ADMIN — PREDNISOLONE ACETATE 1 DROP: 10 SUSPENSION/ DROPS OPHTHALMIC at 20:52

## 2023-07-29 RX ADMIN — CARVEDILOL 25 MG: 25 TABLET, FILM COATED ORAL at 17:44

## 2023-07-29 RX ADMIN — GUAIFENESIN 600 MG: 600 TABLET, EXTENDED RELEASE ORAL at 10:09

## 2023-07-29 RX ADMIN — IPRATROPIUM BROMIDE AND ALBUTEROL SULFATE 1 DOSE: 2.5; .5 SOLUTION RESPIRATORY (INHALATION) at 19:37

## 2023-07-29 RX ADMIN — SODIUM CHLORIDE, PRESERVATIVE FREE 10 ML: 5 INJECTION INTRAVENOUS at 20:51

## 2023-07-29 RX ADMIN — IPRATROPIUM BROMIDE AND ALBUTEROL SULFATE 1 DOSE: 2.5; .5 SOLUTION RESPIRATORY (INHALATION) at 15:58

## 2023-07-29 RX ADMIN — HYDROCODONE BITARTRATE AND ACETAMINOPHEN 1 TABLET: 5; 325 TABLET ORAL at 21:26

## 2023-07-29 RX ADMIN — CLOPIDOGREL BISULFATE 75 MG: 75 TABLET ORAL at 10:09

## 2023-07-29 RX ADMIN — BUDESONIDE AND FORMOTEROL FUMARATE DIHYDRATE 2 PUFF: 160; 4.5 AEROSOL RESPIRATORY (INHALATION) at 07:42

## 2023-07-29 RX ADMIN — ENOXAPARIN SODIUM 30 MG: 100 INJECTION SUBCUTANEOUS at 20:50

## 2023-07-29 RX ADMIN — ENOXAPARIN SODIUM 30 MG: 100 INJECTION SUBCUTANEOUS at 10:08

## 2023-07-29 RX ADMIN — HYDROCODONE BITARTRATE AND ACETAMINOPHEN 1 TABLET: 5; 325 TABLET ORAL at 01:53

## 2023-07-29 RX ADMIN — CARVEDILOL 12.5 MG: 6.25 TABLET, FILM COATED ORAL at 10:06

## 2023-07-29 RX ADMIN — BUMETANIDE 2 MG: 0.25 INJECTION INTRAMUSCULAR; INTRAVENOUS at 12:40

## 2023-07-29 RX ADMIN — ROSUVASTATIN CALCIUM 20 MG: 20 TABLET, FILM COATED ORAL at 20:50

## 2023-07-29 RX ADMIN — GUAIFENESIN 600 MG: 600 TABLET, EXTENDED RELEASE ORAL at 20:51

## 2023-07-29 RX ADMIN — SODIUM CHLORIDE, PRESERVATIVE FREE 10 ML: 5 INJECTION INTRAVENOUS at 10:06

## 2023-07-29 RX ADMIN — IPRATROPIUM BROMIDE AND ALBUTEROL SULFATE 1 DOSE: 2.5; .5 SOLUTION RESPIRATORY (INHALATION) at 07:44

## 2023-07-29 RX ADMIN — TORSEMIDE 20 MG: 20 TABLET ORAL at 12:39

## 2023-07-29 RX ADMIN — PREDNISOLONE ACETATE 1 DROP: 10 SUSPENSION/ DROPS OPHTHALMIC at 15:16

## 2023-07-29 RX ADMIN — BUDESONIDE AND FORMOTEROL FUMARATE DIHYDRATE 2 PUFF: 160; 4.5 AEROSOL RESPIRATORY (INHALATION) at 19:38

## 2023-07-29 RX ADMIN — HYDROCODONE BITARTRATE AND ACETAMINOPHEN 1 TABLET: 5; 325 TABLET ORAL at 10:09

## 2023-07-29 RX ADMIN — BROMFENAC OPHTHALMIC SOLUTION 0.09% 1 DROP: 1.03 SOLUTION/ DROPS OPHTHALMIC at 12:47

## 2023-07-29 RX ADMIN — TAMSULOSIN HYDROCHLORIDE 0.4 MG: 0.4 CAPSULE ORAL at 10:09

## 2023-07-29 RX ADMIN — METHYLPREDNISOLONE SODIUM SUCCINATE 40 MG: 40 INJECTION, POWDER, LYOPHILIZED, FOR SOLUTION INTRAMUSCULAR; INTRAVENOUS at 10:08

## 2023-07-29 RX ADMIN — EMPAGLIFLOZIN 10 MG: 10 TABLET, FILM COATED ORAL at 10:09

## 2023-07-29 RX ADMIN — PREDNISOLONE ACETATE 1 DROP: 10 SUSPENSION/ DROPS OPHTHALMIC at 12:46

## 2023-07-29 RX ADMIN — SPIRONOLACTONE 25 MG: 50 TABLET ORAL at 10:09

## 2023-07-29 RX ADMIN — ASPIRIN 81 MG: 81 TABLET, CHEWABLE ORAL at 10:09

## 2023-07-29 RX ADMIN — HYDROCODONE BITARTRATE AND ACETAMINOPHEN 1 TABLET: 5; 325 TABLET ORAL at 15:14

## 2023-07-29 ASSESSMENT — PAIN - FUNCTIONAL ASSESSMENT: PAIN_FUNCTIONAL_ASSESSMENT: PREVENTS OR INTERFERES SOME ACTIVE ACTIVITIES AND ADLS

## 2023-07-29 ASSESSMENT — PAIN DESCRIPTION - DESCRIPTORS
DESCRIPTORS: ACHING;STABBING;THROBBING
DESCRIPTORS: DISCOMFORT
DESCRIPTORS: ACHING;STABBING;THROBBING

## 2023-07-29 ASSESSMENT — PAIN SCALES - GENERAL
PAINLEVEL_OUTOF10: 3
PAINLEVEL_OUTOF10: 2
PAINLEVEL_OUTOF10: 10

## 2023-07-29 ASSESSMENT — PAIN DESCRIPTION - LOCATION
LOCATION: SHOULDER

## 2023-07-29 ASSESSMENT — PAIN DESCRIPTION - ORIENTATION
ORIENTATION: LEFT

## 2023-07-30 VITALS
BODY MASS INDEX: 40.06 KG/M2 | RESPIRATION RATE: 19 BRPM | DIASTOLIC BLOOD PRESSURE: 84 MMHG | TEMPERATURE: 97.7 F | HEIGHT: 73 IN | OXYGEN SATURATION: 93 % | SYSTOLIC BLOOD PRESSURE: 155 MMHG | HEART RATE: 76 BPM | WEIGHT: 302.25 LBS

## 2023-07-30 LAB
ALBUMIN SERPL-MCNC: 3.2 GM/DL (ref 3.4–5)
ALP BLD-CCNC: 86 IU/L (ref 40–129)
ALT SERPL-CCNC: 25 U/L (ref 10–40)
ANION GAP SERPL CALCULATED.3IONS-SCNC: 6 MMOL/L (ref 4–16)
AST SERPL-CCNC: 20 IU/L (ref 15–37)
BASOPHILS ABSOLUTE: 0 K/CU MM
BASOPHILS RELATIVE PERCENT: 0 % (ref 0–1)
BILIRUB SERPL-MCNC: 0.4 MG/DL (ref 0–1)
BUN SERPL-MCNC: 39 MG/DL (ref 6–23)
CALCIUM SERPL-MCNC: 9.2 MG/DL (ref 8.3–10.6)
CHLORIDE BLD-SCNC: 97 MMOL/L (ref 99–110)
CO2: 36 MMOL/L (ref 21–32)
CREAT SERPL-MCNC: 1.1 MG/DL (ref 0.9–1.3)
DIFFERENTIAL TYPE: ABNORMAL
EOSINOPHILS ABSOLUTE: 0 K/CU MM
EOSINOPHILS RELATIVE PERCENT: 0.3 % (ref 0–3)
GFR SERPL CREATININE-BSD FRML MDRD: >60 ML/MIN/1.73M2
GLUCOSE BLD-MCNC: 114 MG/DL (ref 70–99)
GLUCOSE SERPL-MCNC: 144 MG/DL (ref 70–99)
HCT VFR BLD CALC: 46.9 % (ref 42–52)
HEMOGLOBIN: 14.6 GM/DL (ref 13.5–18)
IMMATURE NEUTROPHIL %: 0.4 % (ref 0–0.43)
LYMPHOCYTES ABSOLUTE: 0.8 K/CU MM
LYMPHOCYTES RELATIVE PERCENT: 10.9 % (ref 24–44)
MAGNESIUM: 2.6 MG/DL (ref 1.8–2.4)
MCH RBC QN AUTO: 28.8 PG (ref 27–31)
MCHC RBC AUTO-ENTMCNC: 31.1 % (ref 32–36)
MCV RBC AUTO: 92.5 FL (ref 78–100)
MONOCYTES ABSOLUTE: 1.1 K/CU MM
MONOCYTES RELATIVE PERCENT: 15.4 % (ref 0–4)
NUCLEATED RBC %: 0 %
PDW BLD-RTO: 12.8 % (ref 11.7–14.9)
PLATELET # BLD: 189 K/CU MM (ref 140–440)
PMV BLD AUTO: 9.3 FL (ref 7.5–11.1)
POTASSIUM SERPL-SCNC: 4.7 MMOL/L (ref 3.5–5.1)
PRO-BNP: 4287 PG/ML
RBC # BLD: 5.07 M/CU MM (ref 4.6–6.2)
SEGMENTED NEUTROPHILS ABSOLUTE COUNT: 5 K/CU MM
SEGMENTED NEUTROPHILS RELATIVE PERCENT: 73 % (ref 36–66)
SODIUM BLD-SCNC: 139 MMOL/L (ref 135–145)
TOTAL IMMATURE NEUTOROPHIL: 0.03 K/CU MM
TOTAL NUCLEATED RBC: 0 K/CU MM
TOTAL PROTEIN: 5 GM/DL (ref 6.4–8.2)
WBC # BLD: 6.9 K/CU MM (ref 4–10.5)

## 2023-07-30 PROCEDURE — 6370000000 HC RX 637 (ALT 250 FOR IP): Performed by: INTERNAL MEDICINE

## 2023-07-30 PROCEDURE — 83735 ASSAY OF MAGNESIUM: CPT

## 2023-07-30 PROCEDURE — 80048 BASIC METABOLIC PNL TOTAL CA: CPT

## 2023-07-30 PROCEDURE — 6370000000 HC RX 637 (ALT 250 FOR IP): Performed by: STUDENT IN AN ORGANIZED HEALTH CARE EDUCATION/TRAINING PROGRAM

## 2023-07-30 PROCEDURE — 6370000000 HC RX 637 (ALT 250 FOR IP): Performed by: PHYSICIAN ASSISTANT

## 2023-07-30 PROCEDURE — 82962 GLUCOSE BLOOD TEST: CPT

## 2023-07-30 PROCEDURE — 36415 COLL VENOUS BLD VENIPUNCTURE: CPT

## 2023-07-30 PROCEDURE — 2580000003 HC RX 258: Performed by: STUDENT IN AN ORGANIZED HEALTH CARE EDUCATION/TRAINING PROGRAM

## 2023-07-30 PROCEDURE — 6360000002 HC RX W HCPCS: Performed by: STUDENT IN AN ORGANIZED HEALTH CARE EDUCATION/TRAINING PROGRAM

## 2023-07-30 PROCEDURE — 80053 COMPREHEN METABOLIC PANEL: CPT

## 2023-07-30 PROCEDURE — 83880 ASSAY OF NATRIURETIC PEPTIDE: CPT

## 2023-07-30 PROCEDURE — 85025 COMPLETE CBC W/AUTO DIFF WBC: CPT

## 2023-07-30 RX ORDER — OXYCODONE HYDROCHLORIDE AND ACETAMINOPHEN 5; 325 MG/1; MG/1
1 TABLET ORAL EVERY 4 HOURS PRN
Qty: 10 TABLET | Refills: 0 | Status: SHIPPED | OUTPATIENT
Start: 2023-07-30 | End: 2023-08-02

## 2023-07-30 RX ORDER — CARVEDILOL 25 MG/1
25 TABLET ORAL 2 TIMES DAILY WITH MEALS
Qty: 60 TABLET | Refills: 3 | Status: SHIPPED | OUTPATIENT
Start: 2023-07-30

## 2023-07-30 RX ORDER — PREDNISONE 20 MG/1
20 TABLET ORAL 2 TIMES DAILY
Qty: 10 TABLET | Refills: 0 | Status: SHIPPED | OUTPATIENT
Start: 2023-07-30 | End: 2023-08-04

## 2023-07-30 RX ORDER — POLYETHYLENE GLYCOL 3350 17 G/17G
17 POWDER, FOR SOLUTION ORAL DAILY PRN
Qty: 30 PACKET | Refills: 0 | Status: SHIPPED | OUTPATIENT
Start: 2023-07-30 | End: 2023-08-29

## 2023-07-30 RX ORDER — BROMFENAC 1.03 MG/ML
1 SOLUTION/ DROPS OPHTHALMIC DAILY
Qty: 1 ML | Refills: 0 | Status: SHIPPED | OUTPATIENT
Start: 2023-07-31 | End: 2023-08-20

## 2023-07-30 RX ORDER — TORSEMIDE 20 MG/1
20 TABLET ORAL DAILY
Qty: 30 TABLET | Refills: 3 | Status: SHIPPED | OUTPATIENT
Start: 2023-07-31

## 2023-07-30 RX ORDER — ROSUVASTATIN CALCIUM 10 MG/1
20 TABLET, COATED ORAL DAILY
Qty: 30 TABLET | Refills: 3 | Status: SHIPPED | OUTPATIENT
Start: 2023-07-30

## 2023-07-30 RX ORDER — PREDNISOLONE ACETATE 10 MG/ML
1 SUSPENSION/ DROPS OPHTHALMIC 3 TIMES DAILY
Qty: 5 ML | Refills: 0 | Status: SHIPPED | OUTPATIENT
Start: 2023-07-30 | End: 2023-09-01

## 2023-07-30 RX ORDER — SPIRONOLACTONE 25 MG/1
25 TABLET ORAL DAILY
Qty: 30 TABLET | Refills: 3 | Status: SHIPPED | OUTPATIENT
Start: 2023-07-31

## 2023-07-30 RX ADMIN — ASPIRIN 81 MG: 81 TABLET, CHEWABLE ORAL at 08:41

## 2023-07-30 RX ADMIN — IPRATROPIUM BROMIDE AND ALBUTEROL SULFATE 1 DOSE: 2.5; .5 SOLUTION RESPIRATORY (INHALATION) at 08:08

## 2023-07-30 RX ADMIN — SACUBITRIL AND VALSARTAN 1 TABLET: 24; 26 TABLET, FILM COATED ORAL at 08:40

## 2023-07-30 RX ADMIN — CLOPIDOGREL BISULFATE 75 MG: 75 TABLET ORAL at 08:41

## 2023-07-30 RX ADMIN — SODIUM CHLORIDE, PRESERVATIVE FREE 10 ML: 5 INJECTION INTRAVENOUS at 08:40

## 2023-07-30 RX ADMIN — BUDESONIDE AND FORMOTEROL FUMARATE DIHYDRATE 2 PUFF: 160; 4.5 AEROSOL RESPIRATORY (INHALATION) at 08:09

## 2023-07-30 RX ADMIN — GUAIFENESIN 600 MG: 600 TABLET, EXTENDED RELEASE ORAL at 08:41

## 2023-07-30 RX ADMIN — ENOXAPARIN SODIUM 30 MG: 100 INJECTION SUBCUTANEOUS at 08:41

## 2023-07-30 RX ADMIN — EMPAGLIFLOZIN 10 MG: 10 TABLET, FILM COATED ORAL at 08:41

## 2023-07-30 RX ADMIN — SPIRONOLACTONE 25 MG: 50 TABLET ORAL at 08:41

## 2023-07-30 RX ADMIN — PREDNISOLONE ACETATE 1 DROP: 10 SUSPENSION/ DROPS OPHTHALMIC at 08:42

## 2023-07-30 RX ADMIN — CARVEDILOL 25 MG: 25 TABLET, FILM COATED ORAL at 08:41

## 2023-07-30 RX ADMIN — BROMFENAC OPHTHALMIC SOLUTION 0.09% 1 DROP: 1.03 SOLUTION/ DROPS OPHTHALMIC at 08:42

## 2023-07-30 RX ADMIN — TORSEMIDE 20 MG: 20 TABLET ORAL at 09:39

## 2023-07-30 RX ADMIN — METHYLPREDNISOLONE SODIUM SUCCINATE 40 MG: 40 INJECTION, POWDER, LYOPHILIZED, FOR SOLUTION INTRAMUSCULAR; INTRAVENOUS at 08:40

## 2023-07-30 RX ADMIN — HYDROCODONE BITARTRATE AND ACETAMINOPHEN 1 TABLET: 5; 325 TABLET ORAL at 03:26

## 2023-07-30 RX ADMIN — HYDROCODONE BITARTRATE AND ACETAMINOPHEN 1 TABLET: 5; 325 TABLET ORAL at 09:38

## 2023-07-30 RX ADMIN — TAMSULOSIN HYDROCHLORIDE 0.4 MG: 0.4 CAPSULE ORAL at 08:41

## 2023-07-30 ASSESSMENT — PAIN DESCRIPTION - DESCRIPTORS
DESCRIPTORS: TEARING
DESCRIPTORS: DISCOMFORT

## 2023-07-30 ASSESSMENT — PAIN - FUNCTIONAL ASSESSMENT: PAIN_FUNCTIONAL_ASSESSMENT: PREVENTS OR INTERFERES SOME ACTIVE ACTIVITIES AND ADLS

## 2023-07-30 ASSESSMENT — PAIN SCALES - GENERAL
PAINLEVEL_OUTOF10: 5
PAINLEVEL_OUTOF10: 8
PAINLEVEL_OUTOF10: 10

## 2023-07-30 ASSESSMENT — PAIN DESCRIPTION - LOCATION
LOCATION: SHOULDER
LOCATION: SHOULDER

## 2023-07-30 ASSESSMENT — PAIN DESCRIPTION - ORIENTATION: ORIENTATION: LEFT

## 2023-07-30 NOTE — DISCHARGE SUMMARY
V2.0  Discharge Summary    Name:  Kimberlee Abraham /Age/Sex: 1955 (55 y.o. male)   Admit Date: 2023  Discharge Date: 23    MRN & CSN:  [id number] & [id number] Encounter Date and Time 23 10:51 AM EDT    Attending:  Peggy Gaston, * Discharging Provider: Peggy Gaston MD       Hospital Course:     Brief HPI:Dougie Macedo is a 77 y.o. male with past medical history of COPD, CAD, KASSIE, HTN, T2DM, RA, BPH & Asbestosis who presents with worsening shortness of breath. Patient states that he went to his primary care physician today for preop evaluation for left rotator cuff repair. Told by primary care physician about O2 sat less than 88% consistently, sent to [facility] for chest x-ray and then called by PCP to come to ED. Patient states that he has been noticing progressively worsening AARON, PND and orthopnea over the past 4-5 weeks. Unsure about weight gain. Did notice bilateral lower extremity edema. Denies worsening cough, sputum production or change in character of sputum, fever, chills, night sweats. Brief Problem Based Course:     Presented with shortness of breath. Was needing continuous BiPAP. Symptoms are likely related to common heart failure and OHS. Initially managed by IV Bumex. Patient improved with BiPAP and diuresis. Now saturating well with 2 L oxygen. Pulm evaluation for BiPAP was done. Which patient qualified. Patient was also evaluated by pulm. Trace for discharge. Advised to follow-up with cardiology and pulm as outpatient.     Acute on chronic respiratory failure with hypoxia and hypercapnia  Acute on chronic systolic/diastolic heart failure with uncontrolled KASSIE/OHS and underlying asbestosis   Echocardiogram in  EF 54%, concentric LVH and dilated LA  Pro BNP 9829 > 3400  Pro-maryana negative  CXR w/ bilateral pleural effusions, mild cephalization of pulmonary vasculature   ABG pH 7.35/PCO2 81/HCO3 44.7 (mixed respiratory acidosis w/

## 2023-07-30 NOTE — DISCHARGE INSTRUCTIONS
For the eye drops, No need to get the new prescription. Can use the ones you have and follow up with Ophthalmology for any changes.

## 2024-02-25 ENCOUNTER — HOSPITAL ENCOUNTER (OUTPATIENT)
Dept: SLEEP CENTER | Age: 69
Discharge: HOME | End: 2024-02-25
Payer: MEDICARE

## 2024-02-25 VITALS — WEIGHT: 315 LBS | HEIGHT: 72 IN | BODY MASS INDEX: 42.66 KG/M2

## 2024-02-25 DIAGNOSIS — G47.33 OSA TREATED WITH BIPAP: ICD-10-CM

## 2024-02-25 PROCEDURE — 95810 POLYSOM 6/> YRS 4/> PARAM: CPT

## 2024-02-26 NOTE — PROGRESS NOTES
2/26/2024  sleep study  for Dougie Crouch  1955 is complete.      Results are pending physician review.    Electronically signed by Rubén Brink on 2/26/2024 at 7:13 AM

## 2024-03-25 ENCOUNTER — OFFICE VISIT (OUTPATIENT)
Dept: PULMONOLOGY | Age: 69
End: 2024-03-25
Payer: MEDICARE

## 2024-03-25 VITALS
RESPIRATION RATE: 18 BRPM | HEIGHT: 73 IN | WEIGHT: 300 LBS | SYSTOLIC BLOOD PRESSURE: 138 MMHG | HEART RATE: 73 BPM | BODY MASS INDEX: 39.76 KG/M2 | DIASTOLIC BLOOD PRESSURE: 76 MMHG | OXYGEN SATURATION: 91 %

## 2024-03-25 DIAGNOSIS — R06.02 SHORTNESS OF BREATH: ICD-10-CM

## 2024-03-25 DIAGNOSIS — J92.0 CALCIFIED PLEURAL PLAQUE DUE TO ASBESTOS EXPOSURE: ICD-10-CM

## 2024-03-25 DIAGNOSIS — J43.2 CENTRILOBULAR EMPHYSEMA (HCC): ICD-10-CM

## 2024-03-25 DIAGNOSIS — I50.812 CHRONIC RIGHT-SIDED HEART FAILURE (HCC): ICD-10-CM

## 2024-03-25 DIAGNOSIS — Z92.89 HISTORY OF BILEVEL POSITIVE AIRWAY PRESSURE (BIPAP) THERAPY: ICD-10-CM

## 2024-03-25 DIAGNOSIS — I27.81 COR PULMONALE (CHRONIC) (HCC): Primary | ICD-10-CM

## 2024-03-25 DIAGNOSIS — I27.20 PULMONARY HYPERTENSION (HCC): ICD-10-CM

## 2024-03-25 DIAGNOSIS — J96.21 ACUTE ON CHRONIC RESPIRATORY FAILURE WITH HYPOXEMIA (HCC): ICD-10-CM

## 2024-03-25 DIAGNOSIS — Z87.891 FORMER SMOKER: ICD-10-CM

## 2024-03-25 DIAGNOSIS — I27.81 COR PULMONALE (CHRONIC) (HCC): ICD-10-CM

## 2024-03-25 DIAGNOSIS — J44.9 STAGE 4 VERY SEVERE COPD BY GOLD CLASSIFICATION (HCC): ICD-10-CM

## 2024-03-25 LAB
EXPIRATORY TIME-POST: NORMAL
EXPIRATORY TIME: NORMAL
FEF 25-75 %CHNG: NORMAL
FEF 25-75 POST %PRED: NORMAL
FEF 25-75% %PRED-PRE: NORMAL
FEF 25-75% PRED: NORMAL
FEF 25-75-POST: NORMAL
FEF 25-75-PRE: NORMAL
FEV1 %PRED-POST: 18.1 %
FEV1 %PRED-PRE: 20 %
FEV1 PRED: 3.65 L
FEV1-POST: 0.66 L
FEV1-PRE: 0.73 L
FEV1/FVC %PRED-POST: 78.7 %
FEV1/FVC %PRED-PRE: 83.2 %
FEV1/FVC PRED: 73.6 %
FEV1/FVC-POST: 57.9 %
FEV1/FVC-PRE: 61.2 %
FVC %PRED-POST: 23 L
FVC %PRED-PRE: 24.1 %
FVC PRED: 4.94 L
FVC-POST: 1.14 L
FVC-PRE: 1.19 L
PEF %PRED-POST: NORMAL
PEF %PRED-PRE: NORMAL
PEF PRED: NORMAL
PEF%CHNG: NORMAL
PEF-POST: NORMAL
PEF-PRE: NORMAL

## 2024-03-25 PROCEDURE — 1036F TOBACCO NON-USER: CPT | Performed by: INTERNAL MEDICINE

## 2024-03-25 PROCEDURE — G8417 CALC BMI ABV UP PARAM F/U: HCPCS | Performed by: INTERNAL MEDICINE

## 2024-03-25 PROCEDURE — 3017F COLORECTAL CA SCREEN DOC REV: CPT | Performed by: INTERNAL MEDICINE

## 2024-03-25 PROCEDURE — 99204 OFFICE O/P NEW MOD 45 MIN: CPT | Performed by: INTERNAL MEDICINE

## 2024-03-25 PROCEDURE — G8427 DOCREV CUR MEDS BY ELIG CLIN: HCPCS | Performed by: INTERNAL MEDICINE

## 2024-03-25 PROCEDURE — 3023F SPIROM DOC REV: CPT | Performed by: INTERNAL MEDICINE

## 2024-03-25 PROCEDURE — 1123F ACP DISCUSS/DSCN MKR DOCD: CPT | Performed by: INTERNAL MEDICINE

## 2024-03-25 PROCEDURE — G8484 FLU IMMUNIZE NO ADMIN: HCPCS | Performed by: INTERNAL MEDICINE

## 2024-03-25 RX ORDER — OXYCODONE AND ACETAMINOPHEN 7.5; 325 MG/1; MG/1
1 TABLET ORAL 4 TIMES DAILY
COMMUNITY

## 2024-03-25 ASSESSMENT — PULMONARY FUNCTION TESTS
FEV1/FVC_POST: 57.9
FEV1_PREDICTED: 3.65
FEV1_POST: 0.66
FVC_PREDICTED: 4.94
FEV1/FVC_PREDICTED: 73.6
FVC_PRE: 1.19
FEV1_PERCENT_PREDICTED_POST: 18.1
FEV1/FVC_PERCENT_PREDICTED_POST: 78.7
FEV1/FVC_PERCENT_PREDICTED_PRE: 83.2
FVC_PERCENT_PREDICTED_POST: 23.0
FVC_PERCENT_PREDICTED_PRE: 24.1
FEV1_PERCENT_PREDICTED_PRE: 20.0
FVC_POST: 1.14
FEV1_PRE: 0.73
FEV1/FVC_PRE: 61.2

## 2024-03-25 NOTE — PROGRESS NOTES
Subjective:   CHIEF COMPLAINT / HPI: Cor pulmonale/chronic right heart failure, acute on chronic hypoxemic respiratory failure, centrilobular emphysema, very severe/stage IV COPD, shortness of breath, BiPAP therapy, asbestos related pleural plaques, pulmonary hypertension, former smoker    Dougie Crouch is a 68-year-old male referred here for another opinion concerning his history of chronic right heart failure/cor pulmonale, chronic hypoxic respiratory failure and severe COPD.  Dougie has been followed by pulmonary here in [place] for a number of years and at one point was diagnosed with severe obstructive sleep apnea on home sleep study but on his most recent sleep study he demonstrated no significant apnea.  He was placed on BiPAP therapy because of chronic hypoxemia and is currently on BiPAP 20/8 at nighttime with supplemental oxygen and does wear oxygen 24 hours a day usually around 2 L/min.  He continues to wear BiPAP 7 to 8 hours per night.  Although he gets very infrequent respiratory infections he does complain of chronic shortness of breath.  His symptoms are consistent with cor pulmonale with shortness of breath, peripheral edema, chronic hypoxemia.  He is morbidly obese.  He has a long history of coronary artery disease and has required angioplasty in the past.  He also does get frequent echocardiograms and is followed for pulmonary hypertension.  He states that when he is noting significant swelling his legs his shortness of breath gets much worse.  He is on multiple medications for peripheral edema including diuretics, unloading agents, Aldactone, Entresto.  He also continues on Breztri 2 and elations twice a day and has an albuterol rescue inhaler use as needed.  He was a pack to 2 pack a day smoker on and off for well over 40 years and quit smoking altogether about 6 years ago.  He did work with the [employer] for a number of years and states that he had significant asbestos exposure as a younger

## 2024-04-05 ENCOUNTER — HOSPITAL ENCOUNTER (OUTPATIENT)
Dept: CT IMAGING | Age: 69
Discharge: HOME | End: 2024-04-05
Attending: INTERNAL MEDICINE
Payer: MEDICARE

## 2024-04-05 DIAGNOSIS — R06.02 SHORTNESS OF BREATH: ICD-10-CM

## 2024-04-05 DIAGNOSIS — I27.81 COR PULMONALE (CHRONIC) (HCC): ICD-10-CM

## 2024-04-05 DIAGNOSIS — I27.20 PULMONARY HYPERTENSION (HCC): ICD-10-CM

## 2024-04-05 DIAGNOSIS — J43.2 CENTRILOBULAR EMPHYSEMA (HCC): ICD-10-CM

## 2024-04-05 DIAGNOSIS — Z92.89 HISTORY OF BILEVEL POSITIVE AIRWAY PRESSURE (BIPAP) THERAPY: ICD-10-CM

## 2024-04-05 DIAGNOSIS — I50.812 CHRONIC RIGHT-SIDED HEART FAILURE (HCC): ICD-10-CM

## 2024-04-05 DIAGNOSIS — J92.0 CALCIFIED PLEURAL PLAQUE DUE TO ASBESTOS EXPOSURE: ICD-10-CM

## 2024-04-05 DIAGNOSIS — J96.21 ACUTE ON CHRONIC RESPIRATORY FAILURE WITH HYPOXEMIA (HCC): ICD-10-CM

## 2024-04-05 PROCEDURE — 71250 CT THORAX DX C-: CPT

## 2024-04-21 ENCOUNTER — HOSPITAL ENCOUNTER (INPATIENT)
Age: 69
LOS: 3 days | Discharge: HOME | DRG: 682 | End: 2024-04-24
Attending: STUDENT IN AN ORGANIZED HEALTH CARE EDUCATION/TRAINING PROGRAM | Admitting: STUDENT IN AN ORGANIZED HEALTH CARE EDUCATION/TRAINING PROGRAM
Payer: MEDICARE

## 2024-04-21 ENCOUNTER — APPOINTMENT (OUTPATIENT)
Dept: CT IMAGING | Age: 69
DRG: 682 | End: 2024-04-21
Payer: MEDICARE

## 2024-04-21 ENCOUNTER — APPOINTMENT (OUTPATIENT)
Dept: GENERAL RADIOLOGY | Age: 69
DRG: 682 | End: 2024-04-21
Payer: MEDICARE

## 2024-04-21 DIAGNOSIS — J96.01 ACUTE HYPOXIC RESPIRATORY FAILURE (HCC): ICD-10-CM

## 2024-04-21 DIAGNOSIS — R79.89 ELEVATED TROPONIN: ICD-10-CM

## 2024-04-21 DIAGNOSIS — R26.2 AMBULATORY DYSFUNCTION: Primary | ICD-10-CM

## 2024-04-21 DIAGNOSIS — R79.89 ELEVATED BRAIN NATRIURETIC PEPTIDE (BNP) LEVEL: ICD-10-CM

## 2024-04-21 DIAGNOSIS — N17.9 AKI (ACUTE KIDNEY INJURY) (HCC): ICD-10-CM

## 2024-04-21 DIAGNOSIS — E87.5 HYPERKALEMIA: ICD-10-CM

## 2024-04-21 LAB
ANION GAP SERPL CALCULATED.3IONS-SCNC: 10 MMOL/L (ref 7–16)
ANION GAP SERPL CALCULATED.3IONS-SCNC: 13 MMOL/L (ref 7–16)
BASE EXCESS MIXED: 2 (ref 0–3)
BASOPHILS ABSOLUTE: 0.1 K/CU MM
BASOPHILS RELATIVE PERCENT: 0.6 % (ref 0–1)
BUN SERPL-MCNC: 48 MG/DL (ref 6–23)
BUN SERPL-MCNC: 51 MG/DL (ref 6–23)
CALCIUM SERPL-MCNC: 8.5 MG/DL (ref 8.3–10.6)
CALCIUM SERPL-MCNC: 8.9 MG/DL (ref 8.3–10.6)
CHLORIDE BLD-SCNC: 96 MMOL/L (ref 99–110)
CHLORIDE BLD-SCNC: 98 MMOL/L (ref 99–110)
CO2: 27 MMOL/L (ref 21–32)
CO2: 32 MMOL/L (ref 21–32)
COMMENT: ABNORMAL
CREAT SERPL-MCNC: 2 MG/DL (ref 0.9–1.3)
CREAT SERPL-MCNC: 2.2 MG/DL (ref 0.9–1.3)
DIFFERENTIAL TYPE: ABNORMAL
EOSINOPHILS ABSOLUTE: 0.4 K/CU MM
EOSINOPHILS RELATIVE PERCENT: 4.3 % (ref 0–3)
GFR SERPL CREATININE-BSD FRML MDRD: 31 ML/MIN/1.73M2
GFR SERPL CREATININE-BSD FRML MDRD: 35 ML/MIN/1.73M2
GLUCOSE BLD-MCNC: 198 MG/DL (ref 70–99)
GLUCOSE BLD-MCNC: 209 MG/DL (ref 70–99)
GLUCOSE BLD-MCNC: 234 MG/DL (ref 70–99)
GLUCOSE SERPL-MCNC: 162 MG/DL (ref 70–99)
GLUCOSE SERPL-MCNC: 245 MG/DL (ref 70–99)
HCO3 VENOUS: 29.4 MMOL/L (ref 22–29)
HCT VFR BLD CALC: 41.5 % (ref 42–52)
HEMOGLOBIN: 12.9 GM/DL (ref 13.5–18)
IMMATURE NEUTROPHIL %: 2.9 % (ref 0–0.43)
INFLUENZA A ANTIGEN: NOT DETECTED
INFLUENZA B ANTIGEN: NOT DETECTED
LYMPHOCYTES ABSOLUTE: 1.2 K/CU MM
LYMPHOCYTES RELATIVE PERCENT: 14.7 % (ref 24–44)
MCH RBC QN AUTO: 30.1 PG (ref 27–31)
MCHC RBC AUTO-ENTMCNC: 31.1 % (ref 32–36)
MCV RBC AUTO: 97 FL (ref 78–100)
MONOCYTES ABSOLUTE: 0.9 K/CU MM
MONOCYTES RELATIVE PERCENT: 10.8 % (ref 0–4)
NEUTROPHILS RELATIVE PERCENT: 66.7 % (ref 36–66)
NUCLEATED RBC %: 1.3 %
O2 SAT, VEN: 91.7 % (ref 50–70)
PCO2, VEN: 57 MMHG (ref 41–51)
PDW BLD-RTO: 12.3 % (ref 11.7–14.9)
PH VENOUS: 7.32 (ref 7.32–7.43)
PLATELET # BLD: 141 K/CU MM (ref 140–440)
PMV BLD AUTO: 9.5 FL (ref 7.5–11.1)
PO2, VEN: 180 MMHG (ref 28–48)
POTASSIUM SERPL-SCNC: 5.3 MMOL/L (ref 3.5–5.1)
POTASSIUM SERPL-SCNC: 6.8 MMOL/L (ref 3.5–5.1)
PRO-BNP: 7931 PG/ML
PROCALCITONIN SERPL-MCNC: 0.16 NG/ML
RBC # BLD: 4.28 M/CU MM (ref 4.6–6.2)
REASON FOR REJECTION: NORMAL
REJECTED TEST: NORMAL
SARS-COV-2 RDRP RESP QL NAA+PROBE: NOT DETECTED
SEGMENTED NEUTROPHILS ABSOLUTE COUNT: 5.5 K/CU MM
SODIUM BLD-SCNC: 136 MMOL/L (ref 135–145)
SODIUM BLD-SCNC: 140 MMOL/L (ref 135–145)
SOURCE: NORMAL
TOTAL IMMATURE NEUTOROPHIL: 0.24 K/CU MM
TOTAL NUCLEATED RBC: 0.1 K/CU MM
TROPONIN, HIGH SENSITIVITY: 69 NG/L (ref 0–22)
TROPONIN, HIGH SENSITIVITY: 70 NG/L (ref 0–22)
WBC # BLD: 8.2 K/CU MM (ref 4–10.5)

## 2024-04-21 PROCEDURE — 72131 CT LUMBAR SPINE W/O DYE: CPT

## 2024-04-21 PROCEDURE — 70450 CT HEAD/BRAIN W/O DYE: CPT

## 2024-04-21 PROCEDURE — 99285 EMERGENCY DEPT VISIT HI MDM: CPT

## 2024-04-21 PROCEDURE — 87635 SARS-COV-2 COVID-19 AMP PRB: CPT

## 2024-04-21 PROCEDURE — 71045 X-RAY EXAM CHEST 1 VIEW: CPT

## 2024-04-21 PROCEDURE — 93005 ELECTROCARDIOGRAM TRACING: CPT | Performed by: STUDENT IN AN ORGANIZED HEALTH CARE EDUCATION/TRAINING PROGRAM

## 2024-04-21 PROCEDURE — 85025 COMPLETE CBC W/AUTO DIFF WBC: CPT

## 2024-04-21 PROCEDURE — 72125 CT NECK SPINE W/O DYE: CPT

## 2024-04-21 PROCEDURE — 6370000000 HC RX 637 (ALT 250 FOR IP): Performed by: STUDENT IN AN ORGANIZED HEALTH CARE EDUCATION/TRAINING PROGRAM

## 2024-04-21 PROCEDURE — 96375 TX/PRO/DX INJ NEW DRUG ADDON: CPT

## 2024-04-21 PROCEDURE — 2580000003 HC RX 258: Performed by: STUDENT IN AN ORGANIZED HEALTH CARE EDUCATION/TRAINING PROGRAM

## 2024-04-21 PROCEDURE — 80048 BASIC METABOLIC PNL TOTAL CA: CPT

## 2024-04-21 PROCEDURE — 84484 ASSAY OF TROPONIN QUANT: CPT

## 2024-04-21 PROCEDURE — 96365 THER/PROPH/DIAG IV INF INIT: CPT

## 2024-04-21 PROCEDURE — 84145 PROCALCITONIN (PCT): CPT

## 2024-04-21 PROCEDURE — 82805 BLOOD GASES W/O2 SATURATION: CPT

## 2024-04-21 PROCEDURE — 2060000000 HC ICU INTERMEDIATE R&B

## 2024-04-21 PROCEDURE — 82962 GLUCOSE BLOOD TEST: CPT

## 2024-04-21 PROCEDURE — 83880 ASSAY OF NATRIURETIC PEPTIDE: CPT

## 2024-04-21 PROCEDURE — 87502 INFLUENZA DNA AMP PROBE: CPT

## 2024-04-21 RX ORDER — DEXTROSE MONOHYDRATE 100 MG/ML
INJECTION, SOLUTION INTRAVENOUS CONTINUOUS PRN
Status: DISCONTINUED | OUTPATIENT
Start: 2024-04-21 | End: 2024-04-24 | Stop reason: HOSPADM

## 2024-04-21 RX ORDER — 0.9 % SODIUM CHLORIDE 0.9 %
1000 INTRAVENOUS SOLUTION INTRAVENOUS ONCE
Status: COMPLETED | OUTPATIENT
Start: 2024-04-21 | End: 2024-04-21

## 2024-04-21 RX ORDER — GLUCAGON 1 MG/ML
1 KIT INJECTION PRN
Status: DISCONTINUED | OUTPATIENT
Start: 2024-04-21 | End: 2024-04-24 | Stop reason: HOSPADM

## 2024-04-21 RX ADMIN — SODIUM CHLORIDE 1000 ML: 9 INJECTION, SOLUTION INTRAVENOUS at 22:40

## 2024-04-21 RX ADMIN — SODIUM ZIRCONIUM CYCLOSILICATE 10 G: 5 POWDER, FOR SUSPENSION ORAL at 22:43

## 2024-04-21 RX ADMIN — DEXTROSE MONOHYDRATE 250 ML: 100 INJECTION, SOLUTION INTRAVENOUS at 22:43

## 2024-04-21 RX ADMIN — INSULIN HUMAN 10 UNITS: 100 INJECTION, SOLUTION PARENTERAL at 23:17

## 2024-04-21 ASSESSMENT — PAIN - FUNCTIONAL ASSESSMENT: PAIN_FUNCTIONAL_ASSESSMENT: 0-10

## 2024-04-21 ASSESSMENT — PAIN SCALES - GENERAL: PAINLEVEL_OUTOF10: 0
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
## 2024-04-21 NOTE — ED TRIAGE NOTES
PT presents to ED with complaint of Generalized fatigue and a fall two days ago while in [place].      Pt takes plavix

## 2024-04-22 LAB
ALBUMIN SERPL-MCNC: 4.1 GM/DL (ref 3.4–5)
ALP BLD-CCNC: 109 IU/L (ref 40–128)
ALT SERPL-CCNC: 15 U/L (ref 10–40)
ANION GAP SERPL CALCULATED.3IONS-SCNC: 11 MMOL/L (ref 7–16)
AST SERPL-CCNC: 14 IU/L (ref 15–37)
BASE EXCESS MIXED: 3.9 (ref 0–3)
BASOPHILS ABSOLUTE: 0.1 K/CU MM
BASOPHILS RELATIVE PERCENT: 0.8 % (ref 0–1)
BILIRUB SERPL-MCNC: 0.4 MG/DL (ref 0–1)
BUN SERPL-MCNC: 37 MG/DL (ref 6–23)
CALCIUM SERPL-MCNC: 8.5 MG/DL (ref 8.3–10.6)
CHLORIDE BLD-SCNC: 103 MMOL/L (ref 99–110)
CO2: 29 MMOL/L (ref 21–32)
COMMENT: ABNORMAL
CREAT SERPL-MCNC: 1.6 MG/DL (ref 0.9–1.3)
DIFFERENTIAL TYPE: ABNORMAL
EKG ATRIAL RATE: 80 BPM
EKG DIAGNOSIS: NORMAL
EKG P AXIS: 44 DEGREES
EKG P-R INTERVAL: 178 MS
EKG Q-T INTERVAL: 398 MS
EKG QRS DURATION: 114 MS
EKG QTC CALCULATION (BAZETT): 459 MS
EKG R AXIS: -74 DEGREES
EKG T AXIS: 23 DEGREES
EKG VENTRICULAR RATE: 80 BPM
EOSINOPHILS ABSOLUTE: 0.3 K/CU MM
EOSINOPHILS RELATIVE PERCENT: 3.6 % (ref 0–3)
ESTIMATED AVERAGE GLUCOSE: 160 MG/DL
GFR SERPL CREATININE-BSD FRML MDRD: 46 ML/MIN/1.73M2
GLUCOSE BLD-MCNC: 172 MG/DL (ref 70–99)
GLUCOSE BLD-MCNC: 174 MG/DL (ref 70–99)
GLUCOSE BLD-MCNC: 184 MG/DL (ref 70–99)
GLUCOSE BLD-MCNC: 191 MG/DL (ref 70–99)
GLUCOSE BLD-MCNC: 211 MG/DL (ref 70–99)
GLUCOSE BLD-MCNC: 281 MG/DL (ref 70–99)
GLUCOSE SERPL-MCNC: 207 MG/DL (ref 70–99)
HBA1C MFR BLD: 7.2 % (ref 4.2–6.3)
HCO3 VENOUS: 32.9 MMOL/L (ref 22–29)
HCT VFR BLD CALC: 41.2 % (ref 42–52)
HEMOGLOBIN: 12.9 GM/DL (ref 13.5–18)
IMMATURE NEUTROPHIL %: 3.5 % (ref 0–0.43)
INR BLD: 1 INDEX
LYMPHOCYTES ABSOLUTE: 1.1 K/CU MM
LYMPHOCYTES RELATIVE PERCENT: 15 % (ref 24–44)
MCH RBC QN AUTO: 30.6 PG (ref 27–31)
MCHC RBC AUTO-ENTMCNC: 31.3 % (ref 32–36)
MCV RBC AUTO: 97.6 FL (ref 78–100)
MONOCYTES ABSOLUTE: 1 K/CU MM
MONOCYTES RELATIVE PERCENT: 13.3 % (ref 0–4)
NEUTROPHILS RELATIVE PERCENT: 63.8 % (ref 36–66)
NUCLEATED RBC %: 1.1 %
O2 SAT, VEN: 91.1 % (ref 50–70)
PCO2, VEN: 70 MMHG (ref 41–51)
PDW BLD-RTO: 12.3 % (ref 11.7–14.9)
PH VENOUS: 7.28 (ref 7.32–7.43)
PLATELET # BLD: 115 K/CU MM (ref 140–440)
PMV BLD AUTO: 9.9 FL (ref 7.5–11.1)
PO2, VEN: 87 MMHG (ref 28–48)
POTASSIUM SERPL-SCNC: 5.1 MMOL/L (ref 3.5–5.1)
PROTHROMBIN TIME: 13.6 SECONDS (ref 11.7–14.5)
RBC # BLD: 4.22 M/CU MM (ref 4.6–6.2)
SEGMENTED NEUTROPHILS ABSOLUTE COUNT: 4.6 K/CU MM
SODIUM BLD-SCNC: 143 MMOL/L (ref 135–145)
TOTAL IMMATURE NEUTOROPHIL: 0.25 K/CU MM
TOTAL NUCLEATED RBC: 0.1 K/CU MM
TOTAL PROTEIN: 6.4 GM/DL (ref 6.4–8.2)
TSH SERPL DL<=0.005 MIU/L-ACNC: 0.57 UIU/ML (ref 0.27–4.2)
WBC # BLD: 7.2 K/CU MM (ref 4–10.5)

## 2024-04-22 PROCEDURE — 83036 HEMOGLOBIN GLYCOSYLATED A1C: CPT

## 2024-04-22 PROCEDURE — 51798 US URINE CAPACITY MEASURE: CPT

## 2024-04-22 PROCEDURE — 6370000000 HC RX 637 (ALT 250 FOR IP): Performed by: STUDENT IN AN ORGANIZED HEALTH CARE EDUCATION/TRAINING PROGRAM

## 2024-04-22 PROCEDURE — 36415 COLL VENOUS BLD VENIPUNCTURE: CPT

## 2024-04-22 PROCEDURE — 97112 NEUROMUSCULAR REEDUCATION: CPT

## 2024-04-22 PROCEDURE — 6360000002 HC RX W HCPCS: Performed by: STUDENT IN AN ORGANIZED HEALTH CARE EDUCATION/TRAINING PROGRAM

## 2024-04-22 PROCEDURE — 2580000003 HC RX 258: Performed by: INTERNAL MEDICINE

## 2024-04-22 PROCEDURE — 84443 ASSAY THYROID STIM HORMONE: CPT

## 2024-04-22 PROCEDURE — 80053 COMPREHEN METABOLIC PANEL: CPT

## 2024-04-22 PROCEDURE — 80048 BASIC METABOLIC PNL TOTAL CA: CPT

## 2024-04-22 PROCEDURE — 94010 BREATHING CAPACITY TEST: CPT

## 2024-04-22 PROCEDURE — 93010 ELECTROCARDIOGRAM REPORT: CPT | Performed by: INTERNAL MEDICINE

## 2024-04-22 PROCEDURE — 97530 THERAPEUTIC ACTIVITIES: CPT

## 2024-04-22 PROCEDURE — 82962 GLUCOSE BLOOD TEST: CPT

## 2024-04-22 PROCEDURE — 85610 PROTHROMBIN TIME: CPT

## 2024-04-22 PROCEDURE — 97166 OT EVAL MOD COMPLEX 45 MIN: CPT

## 2024-04-22 PROCEDURE — 94640 AIRWAY INHALATION TREATMENT: CPT

## 2024-04-22 PROCEDURE — 82805 BLOOD GASES W/O2 SATURATION: CPT

## 2024-04-22 PROCEDURE — 85025 COMPLETE CBC W/AUTO DIFF WBC: CPT

## 2024-04-22 PROCEDURE — 2700000000 HC OXYGEN THERAPY PER DAY

## 2024-04-22 PROCEDURE — 5A09357 ASSISTANCE WITH RESPIRATORY VENTILATION, LESS THAN 24 CONSECUTIVE HOURS, CONTINUOUS POSITIVE AIRWAY PRESSURE: ICD-10-PCS | Performed by: STUDENT IN AN ORGANIZED HEALTH CARE EDUCATION/TRAINING PROGRAM

## 2024-04-22 PROCEDURE — 2580000003 HC RX 258: Performed by: STUDENT IN AN ORGANIZED HEALTH CARE EDUCATION/TRAINING PROGRAM

## 2024-04-22 PROCEDURE — 94660 CPAP INITIATION&MGMT: CPT

## 2024-04-22 PROCEDURE — 2060000000 HC ICU INTERMEDIATE R&B

## 2024-04-22 PROCEDURE — 97162 PT EVAL MOD COMPLEX 30 MIN: CPT

## 2024-04-22 RX ORDER — LANOLIN ALCOHOL/MO/W.PET/CERES
3 CREAM (GRAM) TOPICAL NIGHTLY PRN
Status: DISCONTINUED | OUTPATIENT
Start: 2024-04-22 | End: 2024-04-24 | Stop reason: HOSPADM

## 2024-04-22 RX ORDER — BUDESONIDE AND FORMOTEROL FUMARATE DIHYDRATE 160; 4.5 UG/1; UG/1
2 AEROSOL RESPIRATORY (INHALATION)
Status: DISCONTINUED | OUTPATIENT
Start: 2024-04-22 | End: 2024-04-24 | Stop reason: HOSPADM

## 2024-04-22 RX ORDER — INSULIN LISPRO 100 [IU]/ML
0-4 INJECTION, SOLUTION INTRAVENOUS; SUBCUTANEOUS NIGHTLY
Status: DISCONTINUED | OUTPATIENT
Start: 2024-04-22 | End: 2024-04-24 | Stop reason: HOSPADM

## 2024-04-22 RX ORDER — ACETAMINOPHEN 650 MG/1
650 SUPPOSITORY RECTAL EVERY 6 HOURS PRN
Status: DISCONTINUED | OUTPATIENT
Start: 2024-04-22 | End: 2024-04-24 | Stop reason: HOSPADM

## 2024-04-22 RX ORDER — TORSEMIDE 20 MG/1
20 TABLET ORAL DAILY
Status: DISCONTINUED | OUTPATIENT
Start: 2024-04-22 | End: 2024-04-24 | Stop reason: HOSPADM

## 2024-04-22 RX ORDER — ACETAMINOPHEN 325 MG/1
650 TABLET ORAL EVERY 6 HOURS PRN
Status: DISCONTINUED | OUTPATIENT
Start: 2024-04-22 | End: 2024-04-24 | Stop reason: HOSPADM

## 2024-04-22 RX ORDER — OXYCODONE AND ACETAMINOPHEN 7.5; 325 MG/1; MG/1
1 TABLET ORAL EVERY 8 HOURS PRN
Status: DISCONTINUED | OUTPATIENT
Start: 2024-04-22 | End: 2024-04-24 | Stop reason: HOSPADM

## 2024-04-22 RX ORDER — SODIUM CHLORIDE 0.9 % (FLUSH) 0.9 %
5-40 SYRINGE (ML) INJECTION PRN
Status: DISCONTINUED | OUTPATIENT
Start: 2024-04-22 | End: 2024-04-24 | Stop reason: HOSPADM

## 2024-04-22 RX ORDER — SODIUM CHLORIDE 0.9 % (FLUSH) 0.9 %
5-40 SYRINGE (ML) INJECTION EVERY 12 HOURS SCHEDULED
Status: DISCONTINUED | OUTPATIENT
Start: 2024-04-22 | End: 2024-04-24 | Stop reason: HOSPADM

## 2024-04-22 RX ORDER — ALBUTEROL SULFATE 90 UG/1
2 AEROSOL, METERED RESPIRATORY (INHALATION) EVERY 6 HOURS PRN
Status: DISCONTINUED | OUTPATIENT
Start: 2024-04-22 | End: 2024-04-24 | Stop reason: HOSPADM

## 2024-04-22 RX ORDER — INSULIN LISPRO 100 [IU]/ML
0-4 INJECTION, SOLUTION INTRAVENOUS; SUBCUTANEOUS
Status: DISCONTINUED | OUTPATIENT
Start: 2024-04-22 | End: 2024-04-24 | Stop reason: HOSPADM

## 2024-04-22 RX ORDER — SPIRONOLACTONE 50 MG/1
25 TABLET, FILM COATED ORAL DAILY
Status: DISCONTINUED | OUTPATIENT
Start: 2024-04-22 | End: 2024-04-23

## 2024-04-22 RX ORDER — BUPROPION HYDROCHLORIDE 150 MG/1
150 TABLET ORAL EVERY MORNING
Status: DISCONTINUED | OUTPATIENT
Start: 2024-04-22 | End: 2024-04-24 | Stop reason: HOSPADM

## 2024-04-22 RX ORDER — ONDANSETRON 2 MG/ML
4 INJECTION INTRAMUSCULAR; INTRAVENOUS EVERY 6 HOURS PRN
Status: DISCONTINUED | OUTPATIENT
Start: 2024-04-22 | End: 2024-04-24 | Stop reason: HOSPADM

## 2024-04-22 RX ORDER — POLYETHYLENE GLYCOL 3350 17 G/17G
17 POWDER, FOR SOLUTION ORAL DAILY PRN
Status: DISCONTINUED | OUTPATIENT
Start: 2024-04-22 | End: 2024-04-24 | Stop reason: HOSPADM

## 2024-04-22 RX ORDER — MAGNESIUM SULFATE IN WATER 40 MG/ML
2000 INJECTION, SOLUTION INTRAVENOUS PRN
Status: DISCONTINUED | OUTPATIENT
Start: 2024-04-22 | End: 2024-04-24 | Stop reason: HOSPADM

## 2024-04-22 RX ORDER — ROSUVASTATIN CALCIUM 20 MG/1
20 TABLET, COATED ORAL DAILY
Status: DISCONTINUED | OUTPATIENT
Start: 2024-04-22 | End: 2024-04-24 | Stop reason: HOSPADM

## 2024-04-22 RX ORDER — GABAPENTIN 100 MG/1
100 CAPSULE ORAL 3 TIMES DAILY
Status: DISCONTINUED | OUTPATIENT
Start: 2024-04-22 | End: 2024-04-24 | Stop reason: HOSPADM

## 2024-04-22 RX ORDER — CLOPIDOGREL BISULFATE 75 MG/1
75 TABLET ORAL DAILY
Status: DISCONTINUED | OUTPATIENT
Start: 2024-04-22 | End: 2024-04-24 | Stop reason: HOSPADM

## 2024-04-22 RX ORDER — CARVEDILOL 25 MG/1
25 TABLET ORAL 2 TIMES DAILY WITH MEALS
Status: DISCONTINUED | OUTPATIENT
Start: 2024-04-22 | End: 2024-04-24 | Stop reason: HOSPADM

## 2024-04-22 RX ORDER — BUSPIRONE HYDROCHLORIDE 5 MG/1
5 TABLET ORAL 2 TIMES DAILY
Status: DISCONTINUED | OUTPATIENT
Start: 2024-04-22 | End: 2024-04-24 | Stop reason: HOSPADM

## 2024-04-22 RX ORDER — SODIUM CHLORIDE 9 MG/ML
INJECTION, SOLUTION INTRAVENOUS PRN
Status: DISCONTINUED | OUTPATIENT
Start: 2024-04-22 | End: 2024-04-24 | Stop reason: HOSPADM

## 2024-04-22 RX ORDER — TAMSULOSIN HYDROCHLORIDE 0.4 MG/1
0.4 CAPSULE ORAL DAILY
Status: DISCONTINUED | OUTPATIENT
Start: 2024-04-22 | End: 2024-04-24 | Stop reason: HOSPADM

## 2024-04-22 RX ORDER — OXYCODONE AND ACETAMINOPHEN 7.5; 325 MG/1; MG/1
1 TABLET ORAL 4 TIMES DAILY
Status: DISCONTINUED | OUTPATIENT
Start: 2024-04-22 | End: 2024-04-22

## 2024-04-22 RX ORDER — SODIUM CHLORIDE 9 MG/ML
INJECTION, SOLUTION INTRAVENOUS CONTINUOUS
Status: DISCONTINUED | OUTPATIENT
Start: 2024-04-22 | End: 2024-04-23

## 2024-04-22 RX ORDER — POTASSIUM CHLORIDE 7.45 MG/ML
10 INJECTION INTRAVENOUS PRN
Status: DISCONTINUED | OUTPATIENT
Start: 2024-04-22 | End: 2024-04-24 | Stop reason: HOSPADM

## 2024-04-22 RX ORDER — MORPHINE SULFATE 15 MG/1
15 TABLET, FILM COATED, EXTENDED RELEASE ORAL EVERY 12 HOURS
Status: DISCONTINUED | OUTPATIENT
Start: 2024-04-22 | End: 2024-04-22

## 2024-04-22 RX ORDER — ONDANSETRON 4 MG/1
4 TABLET, ORALLY DISINTEGRATING ORAL EVERY 8 HOURS PRN
Status: DISCONTINUED | OUTPATIENT
Start: 2024-04-22 | End: 2024-04-24 | Stop reason: HOSPADM

## 2024-04-22 RX ORDER — ZOLPIDEM TARTRATE 5 MG/1
5 TABLET ORAL NIGHTLY PRN
Status: DISCONTINUED | OUTPATIENT
Start: 2024-04-22 | End: 2024-04-24 | Stop reason: HOSPADM

## 2024-04-22 RX ORDER — SODIUM CHLORIDE, SODIUM LACTATE, POTASSIUM CHLORIDE, CALCIUM CHLORIDE 600; 310; 30; 20 MG/100ML; MG/100ML; MG/100ML; MG/100ML
INJECTION, SOLUTION INTRAVENOUS CONTINUOUS
Status: DISCONTINUED | OUTPATIENT
Start: 2024-04-22 | End: 2024-04-22

## 2024-04-22 RX ORDER — NICOTINE 21 MG/24HR
1 PATCH, TRANSDERMAL 24 HOURS TRANSDERMAL DAILY PRN
Status: DISCONTINUED | OUTPATIENT
Start: 2024-04-22 | End: 2024-04-24 | Stop reason: HOSPADM

## 2024-04-22 RX ORDER — HEPARIN SODIUM 5000 [USP'U]/ML
5000 INJECTION, SOLUTION INTRAVENOUS; SUBCUTANEOUS EVERY 8 HOURS SCHEDULED
Status: DISCONTINUED | OUTPATIENT
Start: 2024-04-22 | End: 2024-04-24 | Stop reason: HOSPADM

## 2024-04-22 RX ORDER — AMLODIPINE BESYLATE 5 MG/1
5 TABLET ORAL DAILY
Status: DISCONTINUED | OUTPATIENT
Start: 2024-04-22 | End: 2024-04-24 | Stop reason: HOSPADM

## 2024-04-22 RX ADMIN — SODIUM CHLORIDE, POTASSIUM CHLORIDE, SODIUM LACTATE AND CALCIUM CHLORIDE: 600; 310; 30; 20 INJECTION, SOLUTION INTRAVENOUS at 01:28

## 2024-04-22 RX ADMIN — BUSPIRONE HYDROCHLORIDE 5 MG: 5 TABLET ORAL at 01:54

## 2024-04-22 RX ADMIN — BUPROPION HYDROCHLORIDE 150 MG: 150 TABLET, EXTENDED RELEASE ORAL at 09:53

## 2024-04-22 RX ADMIN — HEPARIN SODIUM 5000 UNITS: 5000 INJECTION INTRAVENOUS; SUBCUTANEOUS at 20:25

## 2024-04-22 RX ADMIN — BUDESONIDE AND FORMOTEROL FUMARATE DIHYDRATE 2 PUFF: 160; 4.5 AEROSOL RESPIRATORY (INHALATION) at 19:31

## 2024-04-22 RX ADMIN — BUSPIRONE HYDROCHLORIDE 5 MG: 5 TABLET ORAL at 20:25

## 2024-04-22 RX ADMIN — SODIUM CHLORIDE, PRESERVATIVE FREE 10 ML: 5 INJECTION INTRAVENOUS at 09:54

## 2024-04-22 RX ADMIN — SODIUM CHLORIDE: 9 INJECTION, SOLUTION INTRAVENOUS at 11:53

## 2024-04-22 RX ADMIN — INSULIN LISPRO 2 UNITS: 100 INJECTION, SOLUTION INTRAVENOUS; SUBCUTANEOUS at 11:55

## 2024-04-22 RX ADMIN — SODIUM CHLORIDE: 9 INJECTION, SOLUTION INTRAVENOUS at 23:13

## 2024-04-22 RX ADMIN — ROSUVASTATIN CALCIUM 20 MG: 20 TABLET, COATED ORAL at 09:54

## 2024-04-22 RX ADMIN — CARVEDILOL 25 MG: 25 TABLET, FILM COATED ORAL at 09:53

## 2024-04-22 RX ADMIN — TAMSULOSIN HYDROCHLORIDE 0.4 MG: 0.4 CAPSULE ORAL at 09:53

## 2024-04-22 RX ADMIN — GABAPENTIN 100 MG: 100 CAPSULE ORAL at 14:44

## 2024-04-22 RX ADMIN — ALBUTEROL SULFATE 2 PUFF: 90 AEROSOL, METERED RESPIRATORY (INHALATION) at 19:33

## 2024-04-22 RX ADMIN — CARVEDILOL 25 MG: 25 TABLET, FILM COATED ORAL at 17:34

## 2024-04-22 RX ADMIN — GABAPENTIN 100 MG: 100 CAPSULE ORAL at 09:53

## 2024-04-22 RX ADMIN — BUSPIRONE HYDROCHLORIDE 5 MG: 5 TABLET ORAL at 09:53

## 2024-04-22 RX ADMIN — SODIUM CHLORIDE, PRESERVATIVE FREE 10 ML: 5 INJECTION INTRAVENOUS at 20:34

## 2024-04-22 RX ADMIN — AMLODIPINE BESYLATE 5 MG: 5 TABLET ORAL at 09:53

## 2024-04-22 RX ADMIN — TIOTROPIUM BROMIDE INHALATION SPRAY 2 PUFF: 3.12 SPRAY, METERED RESPIRATORY (INHALATION) at 08:06

## 2024-04-22 RX ADMIN — OXYCODONE AND ACETAMINOPHEN 1 TABLET: 7.5; 325 TABLET ORAL at 11:53

## 2024-04-22 RX ADMIN — HEPARIN SODIUM 5000 UNITS: 5000 INJECTION INTRAVENOUS; SUBCUTANEOUS at 14:45

## 2024-04-22 RX ADMIN — OXYCODONE AND ACETAMINOPHEN 1 TABLET: 7.5; 325 TABLET ORAL at 06:07

## 2024-04-22 RX ADMIN — GABAPENTIN 100 MG: 100 CAPSULE ORAL at 20:25

## 2024-04-22 ASSESSMENT — PAIN SCALES - WONG BAKER
WONGBAKER_NUMERICALRESPONSE: NO HURT
WONGBAKER_NUMERICALRESPONSE: HURTS A LITTLE BIT
WONGBAKER_NUMERICALRESPONSE: NO HURT

## 2024-04-22 ASSESSMENT — PULMONARY FUNCTION TESTS
FEV1 (%PREDICTED): 20
PIF_VALUE: 80
POST BRONCHODILATOR FEV1/FVC: 83

## 2024-04-22 ASSESSMENT — COPD QUESTIONNAIRES
GOLD_GROUP: GROUP E
TOTAL_EXACERBATIONS_PASTYEAR: 0
QUESTION5_HOMEACTIVITIES: 4
QUESTION4_WALKINCLINE: 5
QUESTION3_CHESTTIGHTNESS: 3
QUESTION7_SLEEPQUALITY: 5
QUESTION6_LEAVINGHOUSE: 2
QUESTION8_ENERGYLEVEL: 5
QUESTION1_COUGHFREQUENCY: 3
QUESTION2_CHESTPHLEGM: 1
CAT_TOTALSCORE: 28

## 2024-04-22 ASSESSMENT — LIFESTYLE VARIABLES: HOW OFTEN DO YOU HAVE A DRINK CONTAINING ALCOHOL: NEVER

## 2024-04-22 ASSESSMENT — PAIN DESCRIPTION - ORIENTATION: ORIENTATION: LOWER

## 2024-04-22 ASSESSMENT — PAIN SCALES - GENERAL
PAINLEVEL_OUTOF10: 7
PAINLEVEL_OUTOF10: 6

## 2024-04-22 ASSESSMENT — PAIN DESCRIPTION - DESCRIPTORS: DESCRIPTORS: ACHING

## 2024-04-22 ASSESSMENT — PAIN - FUNCTIONAL ASSESSMENT: PAIN_FUNCTIONAL_ASSESSMENT: ACTIVITIES ARE NOT PREVENTED

## 2024-04-22 ASSESSMENT — PAIN DESCRIPTION - LOCATION: LOCATION: BACK

## 2024-04-22 NOTE — PROGRESS NOTES
RR-RN asked to evaluate patient for PIV placement. 20ga PIV placed x1 attempt with ultrasound guidance. Line freely flushes with brisk blood return noted. Patient tolerated well. IVF connected and primary RN updated.       Gideon Martin RN   [facility] [phone]

## 2024-04-22 NOTE — PLAN OF CARE
Problem: Discharge Planning  Goal: Discharge to home or other facility with appropriate resources  4/22/2024 1003 by Jonathon Wells RN  Outcome: Progressing  4/22/2024 0242 by Manny Sorto RN  Outcome: Progressing  4/22/2024 0242 by Manny Sorto RN  Outcome: Progressing  Flowsheets (Taken 4/22/2024 0211)  Discharge to home or other facility with appropriate resources:   Identify barriers to discharge with patient and caregiver   Refer to discharge planning if patient needs post-hospital services based on physician order or complex needs related to functional status, cognitive ability or social support system   Identify discharge learning needs (meds, wound care, etc)     Problem: Pain  Goal: Verbalizes/displays adequate comfort level or baseline comfort level  4/22/2024 1003 by Jonathon Wells RN  Outcome: Progressing  4/22/2024 0242 by Manny Sorto RN  Outcome: Progressing  4/22/2024 0242 by Manny Sorto RN  Outcome: Progressing     Problem: Safety - Adult  Goal: Free from fall injury  4/22/2024 1003 by Jonathon Wells RN  Outcome: Progressing  4/22/2024 0242 by Manny Sorto RN  Outcome: Progressing     Problem: Skin/Tissue Integrity  Goal: Absence of new skin breakdown  Description: 1.  Monitor for areas of redness and/or skin breakdown  2.  Assess vascular access sites hourly  3.  Every 4-6 hours minimum:  Change oxygen saturation probe site  4.  Every 4-6 hours:  If on nasal continuous positive airway pressure, respiratory therapy assess nares and determine need for appliance change or resting period.  4/22/2024 1003 by Jonathon Wells RN  Outcome: Progressing  4/22/2024 0242 by Manny Sorto RN  Outcome: Progressing

## 2024-04-22 NOTE — CONSULTS
\"ANATITER\", \"RONI\"  24 Hour Urine for Creatinine Clearance:  No components found for: \"CREAT4\", \"UHRS10\", \"UTV10\"  -----------------------------------------------------------------      Assessment and Recommendations     Patient Active Problem List   Diagnosis Code    Centrilobular emphysema (HCC) J43.2    History of colonic polyps Z86.010    FH: colon cancer Z80.0    Gastroenteritis K52.9    CAD S/P percutaneous coronary angioplasty I25.10, Z98.61    Coronary arteriosclerosis after percutaneous transluminal coronary angioplasty (PTCA) I25.10, Z98.61    S/P angioplasty Z98.62    Respiratory failure, acute ([facility]) J96.00    Chronic right-sided heart failure ([facility]) I50.812    Acute on chronic respiratory failure with hypoxemia ([facility]) J96.21    Calcified pleural plaque due to asbestos exposure J92.0    Shortness of breath R06.02    Pulmonary hypertension ([facility]) I27.20    History of bilevel positive airway pressure (BiPAP) therapy Z92.89    Former smoker Z87.891    Stage 4 very severe COPD by GOLD classification ([facility]) J44.9    Hyperkalemia E87.5     Impression plan  #1 acute renal failure?  CKD  #2 hyperkalemia  #3 hypercapnic respiratory failure with COPD/falls  #4 metabolic encephalopathy  #5 coronary disease prior PCI EF 30 to 35%  #6 type 2 diabetes    Plan  #1 creatinine of 2 hold Aldactone diuretics and Entresto for now and gentle hydration  #2 potassium down to 5.3 hold potassium tablets  #3 use BiPAP monitor oxygenation follow-up pulmonary  #4 follow-up affect may need to discuss with family about rehab needs  #5 the setting of CHF and coronary disease monitor in setting of holding diuretics  #6 monitor and control glucose adjust insulin  Supportive care medical management follow-up for now patient not a very good historian  Electronically signed by JOSE ALEJANDRO DAS MD on 4/22/2024 at 10:44 AM

## 2024-04-22 NOTE — CARE COORDINATION
Chart reviewed. Spoke with pt in room. From home with brother. Sister lives nearby. Has cane and walker at home. Baseline does not require DME. Independent of ADLs. PT/OT recs SNF. Discussed recs. Pt considered, but denies SNF placement. Has previously done outpt PT. Would like to do outpt PT again post discharge. Denies HC as well.   Discharge plan home with no needs. Wants out pt PT orders. CM available if needed.     04/22/24 9453   Service Assessment   Patient Orientation Alert and Oriented   Cognition Alert   History Provided By Patient;Medical Record   Support Systems Family Members   Patient's Healthcare Decision Maker is: Legal Next of Kin   PCP Verified by CM Yes   Prior Functional Level Independent in ADLs/IADLs   Current Functional Level Assistance with the following:   Can patient return to prior living arrangement Yes   Ability to make needs known: Good   Family able to assist with home care needs: Yes   Would you like for me to discuss the discharge plan with any other family members/significant others, and if so, who? No   Financial Resources Medicare   Community Resources None   Social/Functional History   Lives With Family   Type of Home House   Home Layout Multi-level;Able to Live on Main level with bedroom/bathroom;Performs ADL's on one level   Bathroom Shower/Tub Walk-in shower

## 2024-04-22 NOTE — PROGRESS NOTES
04/22/24 1556   Spirometry Assessment   FEV1 (%PRED) 20   Post Bronchodilator FEV/FVC 83   COPD Exacerbations in last year 0   PIF 80 L/min   COPD Assessment (CAT Score)   Cough Assessment 3   Phlegm Assessment 1   Chest tightness 3   Walking on an incline 5   Home Activities 4   Confident Leaving The Home 2   Sleeping Soundly 5   Have Energy 5   Assessment Score 28   $RT COPD Assessment Yes   GOLD Staging   Group Group E     Current GOLD classification for Dougie Crouch      GOLD Stage:    Group: (P) Group E  Recorded domestic exacerbations past 12 months: (P) 0  Current recorded COPD Assessment Tool (CAT) score of (P) 28  Current eosinophil count: 0.3     Inhaler Device   Acceptable for Use   Respimat  Not Breath Actuated Yes   MDI  Not Breath Actuated Yes           DPI  Observed PIF   using  In-Check Meter   Optimal PIF   Acceptable for Use   HANDIHALER 80 >30 yes   Pressair 80 >45 yes   NEOHALER 80 >50 yes   Diskus 80 >60 yes   ELLIPTA 80 >60 yes     Records show Dougie Crouch was using Breztri maintenance therapy prior to admission.          LONG-ACTING (LABA)   Arformoterol (Brovana) NEBULIZER   Indacaterol (Arcapta) NEOHALER   Olodaterol (Striverdi) Respimat   Salmeterol (Serevent) MDI, DISKUS   LONG-ACTING (LAMA)   Aclidinium bromide (Tudorza) PRESSAIR   Glycopyrronium bromide (Seebri) NEOHALER   Tiotropium (Spiriva) Respimat, HANDIHALER   Umeclidinium (Incruse) ELLIPTA   (LABA/LAMA)   Formoterol/glycopyrronium (Bevespi) MDI   Indacaterol/glycopyrronium (Utibron) NEOHALER   Vilanterol/umeclidinium (Anoro) ELLIPTA   Olodaterol/tiotropium (Stiolto) Respimat   (LABA/ICS)   Formoterol/budesomide (Symbicort) MDI   Formoterol/mometasone (Dulera) MDI   Salmeterol/fluticasone (Advair) MDI, DISKUS   Vilanterol/fluticasone (Breo) ELLIPTA   (LABA/LAMA/ICS)   Fluticasone/umeclidinium/vilanterol (Trelegy) ELLIPTA   Budesonide/glycopyrrolate/formoterol fumarate (Beztri) aerosphere

## 2024-04-22 NOTE — PROGRESS NOTES
Occupational Therapy  [facility] ACUTE CARE OCCUPATIONAL THERAPY EVALUATION  Dougie Crouch, 1955, 2016/2016-A, 4/22/2024    Discharge Recommendation: Encourage facility for post-acute rehabilitation, anticipate 1-2 hours per day and 5 days per week.    History  [place]:  The primary encounter diagnosis was Ambulatory dysfunction. Diagnoses of NI (acute kidney injury) (HCC), Hyperkalemia, Acute hypoxic respiratory failure (HCC), Elevated troponin, and Elevated brain natriuretic peptide (BNP) level were also pertinent to this visit.  Patient  has a past medical history of Arthritis, COPD (chronic obstructive pulmonary disease) (HCC), Diabetes mellitus (HCC), History of blood transfusion, Hyperlipidemia, Hypertension, and Proteinuria.  Patient  has a past surgical history that includes shoulder surgery (Right, 1990's); Tonsillectomy (as a kid); Colonoscopy (2016); Cholecystectomy (2016); Endoscopy, colon, diagnostic (2016); Cardiac catheterization; joint replacement (Right, 2000); and hernia repair.    Subjective:  Patient states:  \"My knees are starting to get wobbly\"   Pain:  denies pain.    Communication: RN, CM, co-eval with PT Liliana for safety and activity tolerance  Restrictions: fall risk    Home Setup/Prior level of function   Social/Functional History  Lives With: Family  Type of Home: House  Home Layout: Two level, Performs ADL's on one level  Home Access: Stairs to enter with rails  Entrance Stairs - Number of Steps: 3\  Walk-in Shower  Bathroom Shower/Tub: Shower chair with back  Bathroom Toilet: Handicap height  Home Equipment: Cane, Walker, rolling  Has the patient had two or more falls in the past year or any fall with injury in the past year?: Yes (d/t LLE giving out)  Receives Help From: Family (sister lives next door)  ADL Assistance: Independent  Homemaking Assistance: Independent (has housekeeping and laundry services, makes own meals)  Homemaking Responsibilities: Yes  Ambulation  balance safety, B knees and arms began to buckle to chair was brought behind pt and ambulation was terminated d/t safety concern.   Toilet Transfers: not attempted, pt deferred     Balance:   Static sitting balance: contact guard assist    Dynamic sitting balance: contact guard assist    Static standing balance: contact guard assist    Dynamic standing balance: not attempted    Pt educated on role of therapy, educated on pursed lip breathing, POC, 2ww management, extensive cueing for safety throughout, and d/c recommendations     Treatment:  Self Care Training:   Cues were given for safety, sequence, UE/LE placement, visual cues, and balance.    Activities performed today included LB dressing tasks  Therapeutic Activity Training:   Therapeutic activity training was instructed today.  Cues were given for safety, sequence, UE/LE placement, awareness, and balance.    Activities performed today included bed mobility training, sup-sit, sit-stand      Safety: Patient left in chair with alarm on, call light within reach, RN notified, gait belt used.    Assessment:  Pt is a 68 y/o male admitted for The primary encounter diagnosis was Ambulatory dysfunction. Diagnoses of NI (acute kidney injury) (HCC), Hyperkalemia, Acute hypoxic respiratory failure (HCC), Elevated troponin, and Elevated brain natriuretic peptide (BNP) level were also pertinent to this visit.. Pt at baseline is independent with ADLs and modified independent with functional transfers/mobility using 2ww. Pt currently presents w/ deficits relating to ADLs, IADLs, UE strength/ROM, functional activity tolerance, cognition, safety awareness, and functional mobility. Pt would benefit from continued acute care OT services     Complexity: Moderate   Prognosis: Good, no significant barriers to participation at this time.   Occupational Therapy Plan  Times Per Week: 3-5x wk  Times Per Day: Once a day  Current Treatment Recommendations: Strengthening, Balance training,

## 2024-04-22 NOTE — H&P
History and Physical      Name:  Dougie Crouch /Age/Sex: 1955  (69 y.o. male)   MRN & CSN:  [id number] & [id number] Encounter Date/Time: 2024 9:48 PM   Location:  ED17/ED-17 PCP: Renny Alvarado MD       Hospital Day: 1    Assessment and Plan:     Patient is a 69-year-old male who presented with fatigue.    # NI  - Reported to have decreased oral intake over past few days.  - Clinically hypovolemic. Cr 2.2, baseline ~ 0.6. UA ordered.  - Held Entresto, Aldactone and Demadex. Will challenge with gentle IVF. Strict I/O's. Bladder scan if decreased voiding.    # Hyperkalemia  - On Entresto and Aldactone.  - Initial K of 6.8, improved with treatment in ED.   - Follow-up repeat labs.    # Repeated falls  - Reported to have generalized weakness and repeated falls over past few days.    - Exam non-focal. CTH and C/L-spine non-acute.   - PT/OT/CM consulted, appreciate assistance. Fall precautions.      # Acute metabolic encephalopathy  - Sister reported increased confusion over past few days since travel out of state.   - A&Ox self and family only, inattentive. CTH non-acute.   - Continue delirium precautions, avoid hypnotics as possible.    # CAD s/p PCI of LCx in 2019 and RCA in 2020  # Non-MI troponin elevation  - Denied any typical CP.  - Initial Tn elevated, repeat flat. ECG without acute ischemic changes.  - Likely secondary to poor renal clearance in setting of NI. Continue Plavix, Crestor and Coreg.    # HFrEF / ICM, compensated  - Last TTE in 2023 with LVEF of 30-35%.   - Clinically hypovolemic. Weights stable.  - Continue GDMT with Coreg, hold Entresto, Aldactone and Demadex in setting of NI. Monitor volume status closely.    # Very severe COPD with chronic hypoxemic hypercapnic respiratory failure  - On 2-3L NC at baseline.   - No evidence of exacerbation. Continue home inhalers.    # T2DM with hyperglycemia  - Last A1c 7.5% in , repeat pending.   - Held Metformin.  - LCSI.    #

## 2024-04-22 NOTE — PROGRESS NOTES
4 Eyes Skin Assessment     NAME:  Dougie Crouch  YOB: 1955  MEDICAL RECORD NUMBER:  [id number]    The patient is being assessed for  Admission    I agree that at least one RN has performed a thorough Head to Toe Skin Assessment on the patient. ALL assessment sites listed below have been assessed.      Areas assessed by both nurses:    Head, Face, Ears, Shoulders, Back, Chest, Arms, Elbows, Hands, Sacrum. Buttock, Coccyx, Ischium, Legs. Feet and Heels, and Under Medical Devices         Does the Patient have a Wound? No noted wound(s)       Kel Prevention initiated by RN: No  Wound Care Orders initiated by RN: No    Pressure Injury (Stage 3,4, Unstageable, DTI, NWPT, and Complex wounds) if present, place Wound referral order by RN under : No    New Ostomies, if present place, Ostomy referral order under : No     Nurse 1 eSignature: Electronically signed by Manny Sorto RN on 4/22/24 at 2:44 AM EDT    **SHARE this note so that the co-signing nurse can place an eSignature**    Nurse 2 eSignature: Electronically signed by Gerry Sanches RN on 4/22/24 at 4:16 AM EDT

## 2024-04-22 NOTE — CONSULTS
[facility] ACUTE CARE PHYSICAL THERAPY EVALUATION  Dougie Crouch, 1955, 2016/2016-A, 4/22/2024    History  [place]:  The primary encounter diagnosis was Ambulatory dysfunction. Diagnoses of NI (acute kidney injury) (HCC), Hyperkalemia, Acute hypoxic respiratory failure (HCC), Elevated troponin, and Elevated brain natriuretic peptide (BNP) level were also pertinent to this visit.  Patient  has a past medical history of Arthritis, COPD (chronic obstructive pulmonary disease) (HCC), Diabetes mellitus (HCC), History of blood transfusion, Hyperlipidemia, Hypertension, and Proteinuria.  Patient  has a past surgical history that includes shoulder surgery (Right, 1990's); Tonsillectomy (as a kid); Colonoscopy (2016); Cholecystectomy (2016); Endoscopy, colon, diagnostic (2016); Cardiac catheterization; joint replacement (Right, 2000); and hernia repair.    Discharge Recommendation: Encourage facility for moderate post-acute rehabilitation, anticipate 1-2 hours per day and 5 days per week.     Equipment: TBD at next level of care    Subjective:    Patient states:  \"I just want to sleep.\"      Pain:  denies pain.      Communication with other providers:  Handoff to RN, OT    Restrictions: general precautions, fall risk    Home Setup/Prior level of function   Lives With: Family  Type of Home: House  Home Layout: Two level, Performs ADL's on one level  Home Access: Stairs to enter with rails  Entrance Stairs - Number of Steps: 3  Bathroom Shower/Tub: Shower chair with back  Bathroom Toilet: Handicap height  Home Equipment: Cane, Walker, rolling  Has the patient had two or more falls in the past year or any fall with injury in the past year?: No  Receives Help From: Family (sister lives next door)  ADL Assistance: Independent  Homemaking Assistance: Independent (has housekeeping and laundry services, makes own meals)  Homemaking Responsibilities: Yes  Ambulation Assistance: Independent  Transfer Assistance:  Independent  Active : Yes (sister drives mostly)  **taken from prior evaluation as pt questionable historian this date    Examination of body systems (includes body structures/functions, activity/participation limitations):  Observation:  pt supine in bed upon arrival and agreeable to therapy  Vision:  WFL  Hearing:  WFL  Cardiopulmonary:  4L O2, wears 3L at baseline; SpO2 dropped to 84% during mobility, O2 increased to 6L and SpO2 improved to 94%. Pt back on 4L at end of session in chair, SpO2 mid to high 90s  Cognition: impaired, pt frequently falling asleep throughout session, see OT/SLP note for further evaluation.    Musculoskeletal  ROM R/L:  WFL.    Strength R/L:  4-/5, moderate impairment in function and endurance.    Neuro:  WFL      Mobility:  Rolling L/R:  SBA  Supine to sit:  SBA with increased time and cues for sequencing, HOB raised.  Transfers: pt completed STS from EOB CGA and to chair CGA with significantly decreased eccentric control upon descent. Cues provided for safety  Sitting balance:  fair, pt sat EOB CGA progressing to SBA but max cues provided for safety as pt frequently scooting too close to EOB.    Standing balance:  fair, pt stood at RW CGA with UE buckling and LE partially buckling as duration increased. Chair brought to pt. .    Gait: deferred due to safety concerns    [facility] 6 Clicks Inpatient Mobility:  AM-PAC Inpatient Mobility Raw Score : 14    Safety: patient left in chair with alarm on, call light within reach, RN notified, gait belt used.    Assessment:  Pt is a 69 y.o. male admitted to the hospital for hyperkalemia. Pt's PLOF is unsure as pt is a questionable historian this date. Pt is currently performing bed mobility SBA, transferring CGA, and unable to ambulate this date. Pt is presenting with decreased endurance, impaired transfers, impaired gait, impaired strength. Pt would benefit from continued acute care PT as well as Encourage facility for moderate post-acute

## 2024-04-22 NOTE — ED NOTES
ED TO INPATIENT SBAR HANDOFF    Patient Name: Dougie Crouch   :  1955  69 y.o.   Preferred Name    Family/Caregiver Present yes   Restraints no   C-SSRS: Risk of Suicide: No Risk  Sitter no   Sepsis Risk Score Sepsis Risk Score: 2.66      Situation  Chief Complaint   Patient presents with    Fatigue     generalized     Brief Description of Patient's Condition: pt to the ED with generalized weakness and admitted with NI, hyperkalemia, elevated trop/BNP,  and ambulatory dysfunction   Mental Status: disoriented and alert  Arrived from: home    Imaging:   XR CHEST PORTABLE   Final Result   1. No active airspace disease   2. Stable cardiomegaly   3. Stable healed right clavicle and multiple old fractures.      Electronically signed by Bhavesh Ramirez MD      CT LUMBAR SPINE WO CONTRAST   Final Result      1.  Multilevel degenerative changes with posterior spinous process hypertrophic changes in sclerosis, Jeff Davis's type deformity   2.  Mild canal stenosis L4-L5      Electronically signed by Bhavesh Ramirez MD      CT CSpine W/O Contrast   Final Result   1. Reversal of curvature at the C5-6 and C6-7 interspace with degenerative disc space narrowing.   2. Multilevel uncovertebral joint and facet arthropathy   3. Old fracture deformity of the C1 vertebra, healed   4. No evidence of new fracture or acute dislocation      Electronically signed by Bhavesh Ramirez MD      CT Head W/O Contrast   Final Result   1. Age-related atrophic changes   2. No evidence of acute intracranial hemorrhage      Electronically signed by Bhavesh Ramirez MD        Abnormal labs:   Abnormal Labs Reviewed   BASIC METABOLIC PANEL - Abnormal; Notable for the following components:       Result Value    Potassium 6.8 (*)     Chloride 96 (*)     Glucose 162 (*)     BUN 51 (*)     Creatinine 2.2 (*)     Est, Glom Filt Rate 31 (*)     All other components within normal limits   TROPONIN - Abnormal; Notable for the following components:     10 yrs\"- follow with Dr Stack    Proteinuria     scheduled for renal bx 9/1/2017- following with Dr Asencio       Assessment    Vitals: MEWS Score: 1  Level of Consciousness: Alert (0)   Vitals:    04/21/24 2251 04/21/24 2302 04/21/24 2332 04/22/24 0003   BP: (!) 156/63 (!) 142/81 131/65 (!) 140/64   Pulse: 97 97 100 100   Resp: 23 20 23 21   Temp:       TempSrc:       SpO2:  92% 90% 93%   Weight:       Height:         PO Status:   O2 Flow Rate: O2 Device: Nasal cannula O2 Flow Rate (L/min): 3 L/min  Cardiac Rhythm:   Last documented pain medication administered:   NIH Score: NIH     Active LDA's:   Peripheral IV 04/21/24 Right;Anterior Forearm (Active)       Pertinent or High Risk Medications/Drips: no   If Yes, please provide details:   Blood Product Administration: no  If Yes, please provide details:     Recommendation    Incomplete orders   Additional Comments:    If any further questions, please call Sending RN at [phone]    Electronically signed by: Electronically signed by Cami Bey RN on 4/22/2024 at 12:36 AM

## 2024-04-22 NOTE — PLAN OF CARE
Problem: Discharge Planning  Goal: Discharge to home or other facility with appropriate resources  4/22/2024 0242 by Manny Sorto RN  Outcome: Progressing  4/22/2024 0242 by Manny Sorto RN  Outcome: Progressing  Flowsheets (Taken 4/22/2024 0211)  Discharge to home or other facility with appropriate resources:   Identify barriers to discharge with patient and caregiver   Refer to discharge planning if patient needs post-hospital services based on physician order or complex needs related to functional status, cognitive ability or social support system   Identify discharge learning needs (meds, wound care, etc)     Problem: Pain  Goal: Verbalizes/displays adequate comfort level or baseline comfort level  4/22/2024 0242 by Manny Sorto RN  Outcome: Progressing  4/22/2024 0242 by Manny Sorto RN  Outcome: Progressing     Problem: Safety - Adult  Goal: Free from fall injury  Outcome: Progressing     Problem: Skin/Tissue Integrity  Goal: Absence of new skin breakdown  Description: 1.  Monitor for areas of redness and/or skin breakdown  2.  Assess vascular access sites hourly  3.  Every 4-6 hours minimum:  Change oxygen saturation probe site  4.  Every 4-6 hours:  If on nasal continuous positive airway pressure, respiratory therapy assess nares and determine need for appliance change or resting period.  Outcome: Progressing

## 2024-04-22 NOTE — PROGRESS NOTES
of COPD exacerbation. Continue home inhalers.  --Pulm consult     Repeated falls  Reported to have generalized weakness and repeated falls over past few days.    --Exam non-focal. CTH and C/L-spine non-acute, old fractures noted with multilevel degenerative disease. He does have chronic pain issues and was scheduled for pain management. ?placement of pain stimulator  --PT/OT/CM consulted, appreciate assistance. Fall precautions.        CAD s/p PCI of LCx in 12/2019 and RCA in 1/2020  Non-MI troponin elevation  Denied any typical CP.  --Initial Tn elevated, repeat flat. ECG without acute ischemic changes.  --Likely secondary to poor renal clearance in setting of NI.   --Continue Plavix, Crestor and Coreg.     HFrEF / ICM, compensated  Last TTE in 7/2023 with LVEF of 30-35%.   --Appeared clinically hypovolemic. Weights stable. BNP is elevated at 7931 which is higher than his last read but likely in the setting of NI  --Continue Coreg, holding Entresto, Aldactone and Demadex and Jardiance in setting of NI. Monitor volume status closely.      T2DM with hyperglycemia  --Follow up accucheks, A1c 7.2  --Cover with SSI and hypoglycemic protocol     Class II obesity  - BMI 39.6.         Diet ADULT DIET; Regular   DVT Prophylaxis [] Lovenox, [x]  Heparin, [] SCDs, [] Ambulation,  [] Eliquis, [] Xarelto  [] Coumadin   Code Status Full Code   Disposition From: Home  Expected Disposition: Home vs SNF  Estimated Date of Discharge: 1-2 days  Patient requires continued admission due to NI and worsening hypercapnia   Surrogate Decision Maker/ POA  Sister - Kareen     Personally reviewed Lab Studies and Imaging     Subjective:     Chief Complaint: Confusion    Dougie Crouch is a 69 y.o. male who is seen and examined at [facility]. He is drowsy, but awakens to verbal and tactile stimuli. He denies any pain, reports correctly why he was admitted and knows where he is. POC discussed.      Review of Systems:      Pertinent positives  No central canal foraminal stenosis. Normal disc annulus. Facet arthropathy. C3-C4: Mild posterior annular hyperostosis. No central canal stenosis. Foramina patent bilaterally C4-C5: Right uncovertebral joint hypertrophy. Mild right foraminal stenosis. Left is patent. Normal disc annulus and central canal. C5-C6: Mild posterior annular hyperostosis. Mild canal stenosis. Mild bilateral foraminal narrowing C6-C7: Posterior hyperostosis, asymmetric, right greater than left. Mild central canal stenosis. Severe bilateral neural foraminal stenosis, right greater than left C7-T1: Unremarkable     1. Reversal of curvature at the C5-6 and C6-7 interspace with degenerative disc space narrowing. 2. Multilevel uncovertebral joint and facet arthropathy 3. Old fracture deformity of the C1 vertebra, healed 4. No evidence of new fracture or acute dislocation Electronically signed by Bhavesh Ramirez MD    CT Head W/O Contrast    Result Date: 4/21/2024  EXAM: COMPUTED TOMOGRAPHY OF THE BRAIN Noncontrast. INDICATIONS: Trauma, fall, pain COMPARISON: None TECHNICAL FACTORS: Multiplanar contiguous spiral axial scanning Skull base to high convexity with multi-reformatted images. CT radiation dose optimization techniques (automated exposure control, use of a iterative reconstruction techniques, or adjustment of the mA or KV according to the patients size) were used to limit patient radiation dose.] FINDINGS: Noncontrast axial images reveal midline ventricles. Ventricular size and cortical sulci are  prominent compatible with age-related atrophic changes. No intra-axial/extra-axial masses or fluid collections. No evidence of acute cranial hemorrhage. Posterior fossa is within normal limits Paranasal sinuses are unremarkable. Orbits: None Mastoids and Temporal Bones: None Skull base and Bony calvarium remains intact, no destructive lesions.     1. Age-related atrophic changes 2. No evidence of acute intracranial hemorrhage Electronically

## 2024-04-23 ENCOUNTER — APPOINTMENT (OUTPATIENT)
Dept: GENERAL RADIOLOGY | Age: 69
DRG: 682 | End: 2024-04-23
Payer: MEDICARE

## 2024-04-23 LAB
25(OH)D3 SERPL-MCNC: 7.5 NG/ML
ALBUMIN SERPL-MCNC: 3.6 GM/DL (ref 3.4–5)
ALP BLD-CCNC: 89 IU/L (ref 40–128)
ALT SERPL-CCNC: 12 U/L (ref 10–40)
ANION GAP SERPL CALCULATED.3IONS-SCNC: 7 MMOL/L (ref 7–16)
AST SERPL-CCNC: 11 IU/L (ref 15–37)
BACTERIA: ABNORMAL /HPF
BASE EXCESS MIXED: 7 (ref 0–3)
BASOPHILS ABSOLUTE: 0 K/CU MM
BASOPHILS RELATIVE PERCENT: 0.6 % (ref 0–1)
BILIRUB SERPL-MCNC: 0.4 MG/DL (ref 0–1)
BILIRUBIN URINE: NEGATIVE MG/DL
BLOOD, URINE: ABNORMAL
BUN SERPL-MCNC: 24 MG/DL (ref 6–23)
CALCIUM SERPL-MCNC: 8.2 MG/DL (ref 8.3–10.6)
CHLORIDE BLD-SCNC: 106 MMOL/L (ref 99–110)
CLARITY: CLEAR
CO2: 31 MMOL/L (ref 21–32)
COLOR: YELLOW
COMMENT: ABNORMAL
CREAT SERPL-MCNC: 1.1 MG/DL (ref 0.9–1.3)
DIFFERENTIAL TYPE: ABNORMAL
EOSINOPHILS ABSOLUTE: 0.3 K/CU MM
EOSINOPHILS RELATIVE PERCENT: 5.3 % (ref 0–3)
FOLATE SERPL-MCNC: 10.8 NG/ML (ref 3.1–17.5)
GFR SERPL CREATININE-BSD FRML MDRD: 72 ML/MIN/1.73M2
GLUCOSE BLD-MCNC: 156 MG/DL (ref 70–99)
GLUCOSE BLD-MCNC: 213 MG/DL (ref 70–99)
GLUCOSE BLD-MCNC: 243 MG/DL (ref 70–99)
GLUCOSE BLD-MCNC: 298 MG/DL (ref 70–99)
GLUCOSE SERPL-MCNC: 120 MG/DL (ref 70–99)
GLUCOSE, URINE: 500 MG/DL
HCO3 VENOUS: 34.8 MMOL/L (ref 22–29)
HCT VFR BLD CALC: 35.8 % (ref 42–52)
HEMOGLOBIN: 11.4 GM/DL (ref 13.5–18)
HYALINE CASTS: 2 /LPF
IMMATURE NEUTROPHIL %: 1.9 % (ref 0–0.43)
KETONES, URINE: NEGATIVE MG/DL
LEUKOCYTE ESTERASE, URINE: NEGATIVE
LYMPHOCYTES ABSOLUTE: 0.9 K/CU MM
LYMPHOCYTES RELATIVE PERCENT: 15.1 % (ref 24–44)
MCH RBC QN AUTO: 30 PG (ref 27–31)
MCHC RBC AUTO-ENTMCNC: 31.8 % (ref 32–36)
MCV RBC AUTO: 94.2 FL (ref 78–100)
MONOCYTES ABSOLUTE: 0.9 K/CU MM
MONOCYTES RELATIVE PERCENT: 13.8 % (ref 0–4)
MUCUS: ABNORMAL HPF
NEUTROPHILS RELATIVE PERCENT: 63.3 % (ref 36–66)
NITRITE URINE, QUANTITATIVE: NEGATIVE
NUCLEATED RBC %: 0.3 %
O2 SAT, VEN: 82.8 % (ref 50–70)
PCO2, VEN: 63 MMHG (ref 41–51)
PDW BLD-RTO: 12.2 % (ref 11.7–14.9)
PH VENOUS: 7.35 (ref 7.32–7.43)
PH, URINE: 6.5 (ref 5–8)
PLATELET # BLD: 159 K/CU MM (ref 140–440)
PMV BLD AUTO: 9.6 FL (ref 7.5–11.1)
PO2, VEN: 50 MMHG (ref 28–48)
POTASSIUM SERPL-SCNC: 4.3 MMOL/L (ref 3.5–5.1)
PROTEIN UA: >300 MG/DL
RBC # BLD: 3.8 M/CU MM (ref 4.6–6.2)
RBC URINE: 4 /HPF (ref 0–3)
SEGMENTED NEUTROPHILS ABSOLUTE COUNT: 3.9 K/CU MM
SODIUM BLD-SCNC: 144 MMOL/L (ref 135–145)
SPECIFIC GRAVITY UA: 1.02 (ref 1–1.03)
SQUAMOUS EPITHELIAL: <1 /HPF
TOTAL IMMATURE NEUTOROPHIL: 0.12 K/CU MM
TOTAL NUCLEATED RBC: 0 K/CU MM
TOTAL PROTEIN: 5.3 GM/DL (ref 6.4–8.2)
TRICHOMONAS: ABNORMAL /HPF
UROBILINOGEN, URINE: 0.2 MG/DL (ref 0.2–1)
VITAMIN B-12: 301.4 PG/ML (ref 211–911)
WBC # BLD: 6.2 K/CU MM (ref 4–10.5)
WBC UA: 1 /HPF (ref 0–2)

## 2024-04-23 PROCEDURE — 97530 THERAPEUTIC ACTIVITIES: CPT

## 2024-04-23 PROCEDURE — 94640 AIRWAY INHALATION TREATMENT: CPT

## 2024-04-23 PROCEDURE — 71045 X-RAY EXAM CHEST 1 VIEW: CPT

## 2024-04-23 PROCEDURE — 6370000000 HC RX 637 (ALT 250 FOR IP): Performed by: STUDENT IN AN ORGANIZED HEALTH CARE EDUCATION/TRAINING PROGRAM

## 2024-04-23 PROCEDURE — 94664 DEMO&/EVAL PT USE INHALER: CPT

## 2024-04-23 PROCEDURE — 82805 BLOOD GASES W/O2 SATURATION: CPT

## 2024-04-23 PROCEDURE — 6370000000 HC RX 637 (ALT 250 FOR IP): Performed by: INTERNAL MEDICINE

## 2024-04-23 PROCEDURE — 82607 VITAMIN B-12: CPT

## 2024-04-23 PROCEDURE — 82962 GLUCOSE BLOOD TEST: CPT

## 2024-04-23 PROCEDURE — 2580000003 HC RX 258: Performed by: STUDENT IN AN ORGANIZED HEALTH CARE EDUCATION/TRAINING PROGRAM

## 2024-04-23 PROCEDURE — 85025 COMPLETE CBC W/AUTO DIFF WBC: CPT

## 2024-04-23 PROCEDURE — 82306 VITAMIN D 25 HYDROXY: CPT

## 2024-04-23 PROCEDURE — 94761 N-INVAS EAR/PLS OXIMETRY MLT: CPT

## 2024-04-23 PROCEDURE — 94660 CPAP INITIATION&MGMT: CPT

## 2024-04-23 PROCEDURE — 36415 COLL VENOUS BLD VENIPUNCTURE: CPT

## 2024-04-23 PROCEDURE — 2060000000 HC ICU INTERMEDIATE R&B

## 2024-04-23 PROCEDURE — 97116 GAIT TRAINING THERAPY: CPT

## 2024-04-23 PROCEDURE — 81001 URINALYSIS AUTO W/SCOPE: CPT

## 2024-04-23 PROCEDURE — 82746 ASSAY OF FOLIC ACID SERUM: CPT

## 2024-04-23 PROCEDURE — 6360000002 HC RX W HCPCS: Performed by: STUDENT IN AN ORGANIZED HEALTH CARE EDUCATION/TRAINING PROGRAM

## 2024-04-23 PROCEDURE — 2700000000 HC OXYGEN THERAPY PER DAY

## 2024-04-23 PROCEDURE — 80053 COMPREHEN METABOLIC PANEL: CPT

## 2024-04-23 RX ORDER — AZITHROMYCIN 250 MG/1
500 TABLET, FILM COATED ORAL DAILY
Status: COMPLETED | OUTPATIENT
Start: 2024-04-23 | End: 2024-04-23

## 2024-04-23 RX ORDER — AZITHROMYCIN 250 MG/1
250 TABLET, FILM COATED ORAL DAILY
Status: DISCONTINUED | OUTPATIENT
Start: 2024-04-24 | End: 2024-04-24 | Stop reason: HOSPADM

## 2024-04-23 RX ORDER — PREDNISONE 10 MG/1
40 TABLET ORAL DAILY
Status: DISCONTINUED | OUTPATIENT
Start: 2024-04-24 | End: 2024-04-24 | Stop reason: HOSPADM

## 2024-04-23 RX ORDER — IPRATROPIUM BROMIDE AND ALBUTEROL SULFATE 2.5; .5 MG/3ML; MG/3ML
1 SOLUTION RESPIRATORY (INHALATION)
Status: DISCONTINUED | OUTPATIENT
Start: 2024-04-23 | End: 2024-04-24 | Stop reason: HOSPADM

## 2024-04-23 RX ADMIN — AMLODIPINE BESYLATE 5 MG: 5 TABLET ORAL at 09:35

## 2024-04-23 RX ADMIN — BUSPIRONE HYDROCHLORIDE 5 MG: 5 TABLET ORAL at 09:34

## 2024-04-23 RX ADMIN — OXYCODONE AND ACETAMINOPHEN 1 TABLET: 7.5; 325 TABLET ORAL at 17:47

## 2024-04-23 RX ADMIN — BUSPIRONE HYDROCHLORIDE 5 MG: 5 TABLET ORAL at 21:08

## 2024-04-23 RX ADMIN — GABAPENTIN 100 MG: 100 CAPSULE ORAL at 09:35

## 2024-04-23 RX ADMIN — SODIUM CHLORIDE, PRESERVATIVE FREE 10 ML: 5 INJECTION INTRAVENOUS at 21:08

## 2024-04-23 RX ADMIN — WATER 40 MG: 1 INJECTION INTRAMUSCULAR; INTRAVENOUS; SUBCUTANEOUS at 17:47

## 2024-04-23 RX ADMIN — ROSUVASTATIN CALCIUM 20 MG: 20 TABLET, COATED ORAL at 09:36

## 2024-04-23 RX ADMIN — TORSEMIDE 20 MG: 20 TABLET ORAL at 09:36

## 2024-04-23 RX ADMIN — WATER 40 MG: 1 INJECTION INTRAMUSCULAR; INTRAVENOUS; SUBCUTANEOUS at 09:36

## 2024-04-23 RX ADMIN — GABAPENTIN 100 MG: 100 CAPSULE ORAL at 13:20

## 2024-04-23 RX ADMIN — HEPARIN SODIUM 5000 UNITS: 5000 INJECTION INTRAVENOUS; SUBCUTANEOUS at 13:20

## 2024-04-23 RX ADMIN — BUPROPION HYDROCHLORIDE 150 MG: 150 TABLET, EXTENDED RELEASE ORAL at 09:34

## 2024-04-23 RX ADMIN — ACETAMINOPHEN 650 MG: 325 TABLET ORAL at 21:08

## 2024-04-23 RX ADMIN — IPRATROPIUM BROMIDE AND ALBUTEROL SULFATE 1 DOSE: 2.5; .5 SOLUTION RESPIRATORY (INHALATION) at 11:07

## 2024-04-23 RX ADMIN — BUDESONIDE AND FORMOTEROL FUMARATE DIHYDRATE 2 PUFF: 160; 4.5 AEROSOL RESPIRATORY (INHALATION) at 07:10

## 2024-04-23 RX ADMIN — IPRATROPIUM BROMIDE AND ALBUTEROL SULFATE 1 DOSE: 2.5; .5 SOLUTION RESPIRATORY (INHALATION) at 15:12

## 2024-04-23 RX ADMIN — SODIUM CHLORIDE, PRESERVATIVE FREE 10 ML: 5 INJECTION INTRAVENOUS at 09:37

## 2024-04-23 RX ADMIN — HEPARIN SODIUM 5000 UNITS: 5000 INJECTION INTRAVENOUS; SUBCUTANEOUS at 06:27

## 2024-04-23 RX ADMIN — WATER 40 MG: 1 INJECTION INTRAMUSCULAR; INTRAVENOUS; SUBCUTANEOUS at 23:57

## 2024-04-23 RX ADMIN — CARVEDILOL 25 MG: 25 TABLET, FILM COATED ORAL at 09:36

## 2024-04-23 RX ADMIN — INSULIN LISPRO 1 UNITS: 100 INJECTION, SOLUTION INTRAVENOUS; SUBCUTANEOUS at 17:47

## 2024-04-23 RX ADMIN — AZITHROMYCIN DIHYDRATE 500 MG: 250 TABLET ORAL at 13:20

## 2024-04-23 RX ADMIN — OXYCODONE AND ACETAMINOPHEN 1 TABLET: 7.5; 325 TABLET ORAL at 09:44

## 2024-04-23 RX ADMIN — GABAPENTIN 100 MG: 100 CAPSULE ORAL at 21:08

## 2024-04-23 RX ADMIN — IPRATROPIUM BROMIDE AND ALBUTEROL SULFATE 1 DOSE: 2.5; .5 SOLUTION RESPIRATORY (INHALATION) at 22:01

## 2024-04-23 RX ADMIN — TIOTROPIUM BROMIDE INHALATION SPRAY 2 PUFF: 3.12 SPRAY, METERED RESPIRATORY (INHALATION) at 07:12

## 2024-04-23 RX ADMIN — HEPARIN SODIUM 5000 UNITS: 5000 INJECTION INTRAVENOUS; SUBCUTANEOUS at 21:08

## 2024-04-23 RX ADMIN — TAMSULOSIN HYDROCHLORIDE 0.4 MG: 0.4 CAPSULE ORAL at 09:35

## 2024-04-23 RX ADMIN — INSULIN LISPRO 1 UNITS: 100 INJECTION, SOLUTION INTRAVENOUS; SUBCUTANEOUS at 13:21

## 2024-04-23 RX ADMIN — BUDESONIDE AND FORMOTEROL FUMARATE DIHYDRATE 2 PUFF: 160; 4.5 AEROSOL RESPIRATORY (INHALATION) at 22:01

## 2024-04-23 RX ADMIN — CARVEDILOL 25 MG: 25 TABLET, FILM COATED ORAL at 17:46

## 2024-04-23 ASSESSMENT — PAIN - FUNCTIONAL ASSESSMENT
PAIN_FUNCTIONAL_ASSESSMENT: ACTIVITIES ARE NOT PREVENTED
PAIN_FUNCTIONAL_ASSESSMENT: ACTIVITIES ARE NOT PREVENTED

## 2024-04-23 ASSESSMENT — PAIN DESCRIPTION - DESCRIPTORS
DESCRIPTORS: ACHING
DESCRIPTORS: ACHING

## 2024-04-23 ASSESSMENT — PAIN DESCRIPTION - ORIENTATION
ORIENTATION: LOWER
ORIENTATION: LOWER

## 2024-04-23 ASSESSMENT — PAIN DESCRIPTION - ONSET
ONSET: ON-GOING
ONSET: ON-GOING

## 2024-04-23 ASSESSMENT — PAIN DESCRIPTION - FREQUENCY
FREQUENCY: INTERMITTENT
FREQUENCY: CONTINUOUS

## 2024-04-23 ASSESSMENT — PAIN DESCRIPTION - PAIN TYPE
TYPE: CHRONIC PAIN
TYPE: CHRONIC PAIN;ACUTE PAIN

## 2024-04-23 ASSESSMENT — PAIN SCALES - GENERAL
PAINLEVEL_OUTOF10: 8
PAINLEVEL_OUTOF10: 10
PAINLEVEL_OUTOF10: 8
PAINLEVEL_OUTOF10: 6

## 2024-04-23 ASSESSMENT — PAIN DESCRIPTION - LOCATION
LOCATION: BACK
LOCATION: BACK;HEAD

## 2024-04-23 NOTE — PROGRESS NOTES
V2.0    [facility] Progress Note      Name:  Dougie Crouch /Age/Sex: 1955  (69 y.o. male)   MRN & CSN:  [id number] & [id number] Encounter Date/Time: 2024 7:19 AM EDT   Location:  -A PCP: Renny Alvarado MD     Attending:Aditi Atkins MD       Hospital Day: 3    Assessment and Recommendations   Dougie Crouch is a 69 y.o. male with pmh of CAD s/p PCI of LCx in 2019 and RCA in 2020, HFrEF / ICM, severe COPD with chronic hypoxemic respiratory failure (on 2-3L NC), severe KASSIE, HTN, HLD, T2DM and class II obesity  who presents with Hyperkalemia      Plan:   NI - resolving  Hyperkalemia - resolving  Reported to have decreased oral intake over past few days.Initial K 6.8, he is on aldactone,torsemide, entresto and potassium supplements  --Appeared clinically hypovolemic on admission per chart review. Cr 2.2, baseline ~ 0.6.   --K trending down 6.8-->5.3-->5.1  --Started on IVF per nephro and trend so far 2.2-->2-->1.6  --Monitor trends, renally dose meds and avoid nephrotoxic meds  --Monitor I/O  --Hold aldactone, entresto, torsemide and potassium supplements  --F/U urinary studies  --Nephro on board, appreciate recs     Acute metabolic encephalopathy - resolving  Sister reported increased confusion over past few days since travel out of [facility].   --Initially A&Ox self and family only, and inattentive.   --CTH non-acute.   --This AM A&Ox3 (Self, place and situation) although sleepy  --Continue delirium precautions, and avoid hypnotics as possible.    Acute on chronic resp failure with hypoxia and hypercapnia  Severe COPD with exacerbation  Severe AKSSIE  Patient follows with pulmonology and is on 2-3L NC at baseline and CPAP at bedtime. He endorses compliance with CPAP but per sister, he is not compliant  --Initial VBG 7.32/57/180/29.4 was on BiPAP at night and switched to 4L via NC. Worsened on repeat (obtained due to his drowsiness) 7.28//87/32.9. Put back on BiPAP and obtain repeat VBG in  MD on 4/23/2024 at 4:30 PM

## 2024-04-23 NOTE — PROGRESS NOTES
Occupational Therapy Treatment Note    Name: Dougie Crouch MRN: [id number] :   1955   Date:  2024   Admission Date: 2024 Room:  -A         Restrictions/Precautions:  fall risk    Communication with other providers: RN approval of therapy, co-tx with PT, OT in hallway and available if needed.     Subjective:  Patient states: \" Make sure to tell my doctor how I did\"   Pain:   Location, Type, Intensity (0/10 to 10/10):  Pain in L knee, no rating given     Objective:    Observation: pt sitting in chair upon arrival, agreeable to therapy  Objective Measures:  4L O2, tele, A&O x4    Treatment, including education:  Therapeutic Activity Training:   Therapeutic activity training was instructed today.  Cues were given for safety, sequence, UE/LE placement, awareness, and balance.    Activities performed today including, sit-stand, functional mobility    Self Care Training:   Cues were given for safety, sequence, UE/LE placement, visual cues, and balance.    Activities performed today included LB dressing tasks    Readjust socks sitting in chair with SBA, min v/c's for safety. STS from chair x2 with CGA, v/c's for sequencing. Functional mobility 80' + 80' using FWW with CGA for balance safety, x1 seated rest break, v/c's for pursed lip breathing.  Stand to sit x2 to chair with CGA for controlled descent, moderate v/c's for walker management and body mechanics (reaching back towards chair during descent). Pt deferred ADL tasks of toileting and grooming tasks stating that he will have to go to bathroom later but not now.     Pt educated on role of OT, benefits of OT, and rationale for therapeutic intervention. Educated on need to be patient advocate, assist to fullest ability with ADL completion, and benefits of OOB activity. Pt left in chair, alarm on, call light within reach, all current needs met     Assessment / Impression:    Patient's tolerance of treatment: Well  Adverse Reaction:  None  Significant change in status and impact: Improved from initial evaluation  Barriers to improvement: None noted      Plan for Next Session:    Cont OT POC    Time in:  1012  Time out:  1027  Timed treatment minutes:  15  Total treatment time:  15      Electronically signed by:    CARISSA Kowng/QUINN  4/23/2024, 12:09 PM

## 2024-04-23 NOTE — CONSULTS
[facility] [place] [id number]                              CONSULTATION      PATIENT NAME: KALE FUNK                : 1955  MED REC NO: [id number]                      ROOM: 2016  ACCOUNT NO: [id number]                       ADMIT DATE: 2024  PROVIDER: Donaldo Clay MD      HISTORY OF PRESENT ILLNESS:  The patient is a 69-year-old gentleman with multiple medical problems including COPD; chronic respiratory failure, on home oxygen at 2 to 3 L nasal cannula; severe obstructive sleep apnea who was admitted through the emergency room with complaints of generalized weakness, confusion, and falls.  He denied any hemoptysis.  He denied any fever or chills.  He was complaining of occasional cough.  In the emergency room, he had a chest x-ray which showed no acute disease.  His venous blood gases showed evidence of acute on chronic respiratory failure with pH of 7.28, pCO2 of 70, PO2 of 87 with 91% saturation.  He was subsequently admitted to the hospital.    PAST MEDICAL HISTORY:  Significant for COPD; chronic respiratory failure, on home oxygen; obstructive sleep apnea; hypertension; hyperlipidemia; diabetes.    PAST SURGICAL HISTORY:  Remarkable for cardiac catheterization, cholecystectomy, colonoscopy, hernia repair, joint replacement surgery, shoulder surgery, and tonsillectomy.    FAMILY HISTORY:  Reveals that his mother had heart disease and hypertension.  Father had cancer.    SOCIAL HISTORY:  Reveals that he quit smoking in 2018, but used to smoke a pack per day for 50 years prior to that.  He drinks alcohol off and on.  No history of drug abuse.    MEDICATIONS:  Reviewed.    ALLERGIES:  HE HAS NO KNOWN ALLERGIES.      REVIEW OF SYSTEMS:  10- to 14-point review of systems was reviewed and is negative except for what is mentioned in the history of present illness.    PHYSICAL EXAMINATION:  GENERAL:  The patient

## 2024-04-23 NOTE — PROGRESS NOTES
Physical Therapy Treatment Note  Name: Dougie Crouch MRN: [id number] :   1955   Date:  2024   Admission Date: 2024 Room:  -A     Restrictions/Precautions:          general precautions, fall risk    Communication with other providers:  RN, OT    Subjective:  Patient states:  \"Don't be too scared to come back here again!\"  Pain:   Location, Type, Intensity (0/10 to 10/10): states pain in L knee, does not numerically rate    Objective:    Observation:  pt up in chair upon entry and agreeable to session    Treatment, including education/measures:    Transfers: pt completed STS to/from chair x2 trials CGA with cues for hand placement and sequencing. Pt with poor eccentric control upon descent    Gait: pt ambulated 80' + 80' with RW CGA with decreased dario and downward gaze. Cues provided for pursed lip breathing and seated rest break provided in between bouts. Chair follow provided for safety.    Assessment / Impression:    Pt up in chair at end of session with needs in reach and alarm on    Patient's tolerance of treatment:  fair   Adverse Reaction: n/a  Significant change in status and impact:  improved functional mobility this date compared to eval  Barriers to improvement:  decreased endurance    Plan for Next Session:    Continue to address transfer training and gait training in future sessions    Time in:  1012  Time out:  1027  Timed treatment minutes:  15  Total treatment time:  15    Previously filed items:  Social/Functional History  Lives With: Family  Type of Home: House  Home Layout: Multi-level, Able to Live on Main level with bedroom/bathroom, Performs ADL's on one level  Bathroom Shower/Tub: Walk-in shower        Short Term Goals  Time Frame for Short Term Goals: 1 week  Short Term Goal 1: pt to complete all bed mobility mod I  Short Term Goal 2: pt to complete all STS transfers to/from bed, commode, and chair SBA  Short Term Goal 3: pt to ambulate 25' with LRAD  CGA    Electronically signed by:    Vane August, PT  4/23/2024, 12:54 PM

## 2024-04-23 NOTE — PROGRESS NOTES
Nephrology Progress Note  4/23/2024 9:03 AM  Subjective:     Interval History: Dougie Crouch is a 69 y.o. male with weak went to the commode with one-person assist still short of breath with exertion and on IV fluids but more awake I will restart therapy for heart failure        Data:   Scheduled Meds:   methylPREDNISolone  40 mg IntraVENous Q8H    [START ON 4/24/2024] predniSONE  40 mg Oral Daily    amLODIPine  5 mg Oral Daily    busPIRone  5 mg Oral BID    buPROPion  150 mg Oral QAM    carvedilol  25 mg Oral BID WC    clopidogrel  75 mg Oral Daily    gabapentin  100 mg Oral TID    rosuvastatin  20 mg Oral Daily    [Held by provider] sacubitril-valsartan  1 tablet Oral BID    empagliflozin  10 mg Oral Daily    tamsulosin  0.4 mg Oral Daily    torsemide  20 mg Oral Daily    sodium chloride flush  5-40 mL IntraVENous 2 times per day    heparin (porcine)  5,000 Units SubCUTAneous 3 times per day    budesonide-formoterol  2 puff Inhalation BID RT    tiotropium  2 puff Inhalation Daily RT    insulin lispro  0-4 Units SubCUTAneous TID WC    insulin lispro  0-4 Units SubCUTAneous Nightly    albuterol  10 mg Nebulization Once     Continuous Infusions:   sodium chloride      dextrose           CBC   Recent Labs     04/21/24  2158 04/22/24  1029 04/23/24  0152   WBC 8.2 7.2 6.2   HGB 12.9* 12.9* 11.4*   HCT 41.5* 41.2* 35.8*    115* 159      BMP   Recent Labs     04/21/24  2300 04/22/24  1029 04/23/24  0152    143 144   K 5.3* 5.1 4.3   CL 98* 103 106   CO2 32 29 31   BUN 48* 37* 24*   CREATININE 2.0* 1.6* 1.1     Hepatic:   Recent Labs     04/22/24  1029 04/23/24  0152   AST 14* 11*   ALT 15 12   BILITOT 0.4 0.4   ALKPHOS 109 89     Troponin: No results for input(s): \"TROPONINI\" in the last 72 hours.  BNP: No results for input(s): \"BNP\" in the last 72 hours.  Lipids: No results for input(s): \"CHOL\", \"HDL\" in the last 72 hours.    Invalid input(s): \"LDLCALCU\"  ABGs:   Lab Results   Component Value Date/Time     PO2ART 86 07/27/2023 09:00 AM    BJW9VJU 68.0 07/27/2023 09:00 AM     INR:   Recent Labs     04/22/24  1029   INR 1.0     Renal Labs  Albumin:  No results found for: \"LABALBU\"  Calcium:    Lab Results   Component Value Date/Time    CALCIUM 8.2 04/23/2024 01:52 AM     Phosphorus:    Lab Results   Component Value Date/Time    PHOS 4.3 07/27/2023 02:27 AM     U/A:    Lab Results   Component Value Date/Time    NITRU NEGATIVE 02/06/2019 10:40 PM    COLORU YELLOW 02/06/2019 10:40 PM    WBCUA 1 02/06/2019 10:40 PM    RBCUA 2 02/06/2019 10:40 PM    MUCUS RARE 02/06/2019 10:40 PM    TRICHOMONAS NONE SEEN 02/06/2019 10:40 PM    BACTERIA NEGATIVE 02/06/2019 10:40 PM    CLARITYU CLEAR 02/06/2019 10:40 PM    SPECGRAV 1.022 02/06/2019 10:40 PM    UROBILINOGEN NORMAL 02/06/2019 10:40 PM    BILIRUBINUR NEGATIVE 02/06/2019 10:40 PM    BLOODU NEGATIVE 02/06/2019 10:40 PM    KETUA SMALL 02/06/2019 10:40 PM     ABG:    Lab Results   Component Value Date/Time    EIO4XJD 68.0 07/27/2023 09:00 AM    PO2ART 86 07/27/2023 09:00 AM    TSL7OVP 38.4 07/27/2023 09:00 AM     HgBA1c:    Lab Results   Component Value Date/Time    LABA1C 7.2 04/22/2024 10:29 AM     Microalbumen/Creatinine ratio:  No components found for: \"RUCREAT\"  TSH:  No results found for: \"TSH\"  IRON:  No results found for: \"IRON\"  Iron Saturation:  No components found for: \"PERCENTFE\"  TIBC:  No results found for: \"TIBC\"  FERRITIN:  No results found for: \"FERRITIN\"  RPR:  No results found for: \"RPR\"  RONI:  No results found for: \"ANATITER\", \"RONI\"  24 Hour Urine for Creatinine Clearance:  No components found for: \"CREAT4\", \"UHRS10\", \"UTV10\"      Objective:   I/O: 04/22 0701 - 04/23 0700  In: 2592.3 [P.O.:960; I.V.:1632.3]  Out: 1175 [Urine:1175]  I/O last 3 completed shifts:  In: 2592.3 [P.O.:960; I.V.:1632.3]  Out: 1969 [Urine:1969]  No intake/output data recorded.  Vitals: BP (!) 140/76   Pulse 72   Temp 97.6 °F (36.4 °C) (Oral)   Resp 26   Ht 1.854 m (6' 1\")   Wt 130.5

## 2024-04-23 NOTE — PLAN OF CARE
Problem: Discharge Planning  Goal: Discharge to home or other facility with appropriate resources  4/22/2024 2249 by Keena Holguin RN  Outcome: Progressing  4/22/2024 1003 by Jonathon Wells RN  Outcome: Progressing     Problem: Pain  Goal: Verbalizes/displays adequate comfort level or baseline comfort level  4/22/2024 2249 by Keena Holguin RN  Outcome: Progressing  4/22/2024 1003 by Jonathon Wells RN  Outcome: Progressing     Problem: Safety - Adult  Goal: Free from fall injury  4/22/2024 2249 by Keena Holguin RN  Outcome: Progressing  4/22/2024 1003 by Jonathon Wells RN  Outcome: Progressing     Problem: Skin/Tissue Integrity  Goal: Absence of new skin breakdown  Description: 1.  Monitor for areas of redness and/or skin breakdown  2.  Assess vascular access sites hourly  3.  Every 4-6 hours minimum:  Change oxygen saturation probe site  4.  Every 4-6 hours:  If on nasal continuous positive airway pressure, respiratory therapy assess nares and determine need for appliance change or resting period.  4/22/2024 2249 by Keena Holguin RN  Outcome: Progressing  4/22/2024 1003 by Jonathon Wells RN  Outcome: Progressing     Problem: Neurosensory - Adult  Goal: Achieves stable or improved neurological status  Outcome: Progressing  Goal: Achieves maximal functionality and self care  Outcome: Progressing     Problem: Respiratory - Adult  Goal: Achieves optimal ventilation and oxygenation  Outcome: Progressing     Problem: Cardiovascular - Adult  Goal: Maintains optimal cardiac output and hemodynamic stability  Outcome: Progressing  Goal: Absence of cardiac dysrhythmias or at baseline  Outcome: Progressing     Problem: Skin/Tissue Integrity - Adult  Goal: Skin integrity remains intact  Outcome: Progressing  Goal: Incisions, wounds, or drain sites healing without S/S of infection  Outcome: Progressing  Goal: Oral mucous membranes remain intact  Outcome: Progressing     Problem: Musculoskeletal -  Adult  Goal: Return mobility to safest level of function  Outcome: Progressing  Goal: Return ADL status to a safe level of function  Outcome: Progressing     Problem: Gastrointestinal - Adult  Goal: Minimal or absence of nausea and vomiting  Outcome: Progressing  Goal: Maintains or returns to baseline bowel function  Outcome: Progressing  Goal: Maintains adequate nutritional intake  Outcome: Progressing     Problem: Genitourinary - Adult  Goal: Absence of urinary retention  Outcome: Progressing     Problem: Infection - Adult  Goal: Absence of infection at discharge  Outcome: Progressing  Goal: Absence of infection during hospitalization  Outcome: Progressing     Problem: Metabolic/Fluid and Electrolytes - Adult  Goal: Electrolytes maintained within normal limits  Outcome: Progressing  Goal: Hemodynamic stability and optimal renal function maintained  Outcome: Progressing  Goal: Glucose maintained within prescribed range  Outcome: Progressing     Problem: Hematologic - Adult  Goal: Maintains hematologic stability  Outcome: Progressing

## 2024-04-23 NOTE — PLAN OF CARE
Problem: Discharge Planning  Goal: Discharge to home or other facility with appropriate resources  4/23/2024 0905 by Sarah Beavers RN  Outcome: Progressing  4/22/2024 2249 by Keena Holguin RN  Outcome: Progressing     Problem: Pain  Goal: Verbalizes/displays adequate comfort level or baseline comfort level  4/23/2024 0905 by Sarah Beavers RN  Outcome: Progressing  4/22/2024 2249 by Keena Holguin RN  Outcome: Progressing     Problem: Safety - Adult  Goal: Free from fall injury  4/23/2024 0905 by Sarah Beavers RN  Outcome: Progressing  4/22/2024 2249 by Keena Holguin RN  Outcome: Progressing     Problem: Skin/Tissue Integrity  Goal: Absence of new skin breakdown  Description: 1.  Monitor for areas of redness and/or skin breakdown  2.  Assess vascular access sites hourly  3.  Every 4-6 hours minimum:  Change oxygen saturation probe site  4.  Every 4-6 hours:  If on nasal continuous positive airway pressure, respiratory therapy assess nares and determine need for appliance change or resting period.  4/23/2024 0905 by Sarah Beavers RN  Outcome: Progressing  4/22/2024 2249 by Keena Holguin RN  Outcome: Progressing     Problem: Neurosensory - Adult  Goal: Achieves stable or improved neurological status  4/23/2024 0905 by Sarah Beavers RN  Outcome: Progressing  4/22/2024 2249 by Keena Holguin RN  Outcome: Progressing  Goal: Achieves maximal functionality and self care  4/23/2024 0905 by Sarah Beavers RN  Outcome: Progressing  4/22/2024 2249 by Keena Holguin RN  Outcome: Progressing     Problem: Respiratory - Adult  Goal: Achieves optimal ventilation and oxygenation  4/23/2024 0905 by Sarah Beavers RN  Outcome: Progressing  4/22/2024 2249 by Keena Holguin RN  Outcome: Progressing     Problem: Cardiovascular - Adult  Goal: Maintains optimal cardiac output and hemodynamic stability  4/23/2024 0905 by Sarah Beavers RN  Outcome: Progressing  4/22/2024 2249 by Keena Holguin

## 2024-04-24 VITALS
OXYGEN SATURATION: 95 % | RESPIRATION RATE: 21 BRPM | HEIGHT: 73 IN | WEIGHT: 287.7 LBS | TEMPERATURE: 97.9 F | HEART RATE: 73 BPM | BODY MASS INDEX: 38.13 KG/M2 | DIASTOLIC BLOOD PRESSURE: 78 MMHG | SYSTOLIC BLOOD PRESSURE: 140 MMHG

## 2024-04-24 LAB
ALBUMIN SERPL-MCNC: 4 GM/DL (ref 3.4–5)
ALP BLD-CCNC: 100 IU/L (ref 40–128)
ALT SERPL-CCNC: 13 U/L (ref 10–40)
ANION GAP SERPL CALCULATED.3IONS-SCNC: 11 MMOL/L (ref 7–16)
AST SERPL-CCNC: 13 IU/L (ref 15–37)
BASE EXCESS MIXED: 5.2 (ref 0–3)
BASOPHILS ABSOLUTE: 0 K/CU MM
BASOPHILS RELATIVE PERCENT: 0.2 % (ref 0–1)
BILIRUB SERPL-MCNC: 0.5 MG/DL (ref 0–1)
BUN SERPL-MCNC: 20 MG/DL (ref 6–23)
CALCIUM SERPL-MCNC: 8.7 MG/DL (ref 8.3–10.6)
CHLORIDE BLD-SCNC: 103 MMOL/L (ref 99–110)
CO2: 28 MMOL/L (ref 21–32)
COMMENT: ABNORMAL
CREAT SERPL-MCNC: 1.1 MG/DL (ref 0.9–1.3)
DIFFERENTIAL TYPE: ABNORMAL
EOSINOPHILS ABSOLUTE: 0 K/CU MM
EOSINOPHILS RELATIVE PERCENT: 0 % (ref 0–3)
GFR SERPL CREATININE-BSD FRML MDRD: 72 ML/MIN/1.73M2
GLUCOSE BLD-MCNC: 214 MG/DL (ref 70–99)
GLUCOSE BLD-MCNC: 245 MG/DL (ref 70–99)
GLUCOSE SERPL-MCNC: 221 MG/DL (ref 70–99)
HCO3 VENOUS: 32.6 MMOL/L (ref 22–29)
HCT VFR BLD CALC: 37.3 % (ref 42–52)
HEMOGLOBIN: 12.2 GM/DL (ref 13.5–18)
IMMATURE NEUTROPHIL %: 1.3 % (ref 0–0.43)
LYMPHOCYTES ABSOLUTE: 0.6 K/CU MM
LYMPHOCYTES RELATIVE PERCENT: 11.1 % (ref 24–44)
MAGNESIUM: 2.1 MG/DL (ref 1.8–2.4)
MCH RBC QN AUTO: 29.8 PG (ref 27–31)
MCHC RBC AUTO-ENTMCNC: 32.7 % (ref 32–36)
MCV RBC AUTO: 91 FL (ref 78–100)
MONOCYTES ABSOLUTE: 0.2 K/CU MM
MONOCYTES RELATIVE PERCENT: 2.9 % (ref 0–4)
NEUTROPHILS RELATIVE PERCENT: 84.5 % (ref 36–66)
NUCLEATED RBC %: 0 %
O2 SAT, VEN: 90.9 % (ref 50–70)
PCO2, VEN: 59 MMHG (ref 41–51)
PDW BLD-RTO: 12 % (ref 11.7–14.9)
PH VENOUS: 7.35 (ref 7.32–7.43)
PHOSPHORUS: 1.7 MG/DL (ref 2.5–4.9)
PLATELET # BLD: 127 K/CU MM (ref 140–440)
PMV BLD AUTO: 9.8 FL (ref 7.5–11.1)
PO2, VEN: 77 MMHG (ref 28–48)
POTASSIUM SERPL-SCNC: 4.7 MMOL/L (ref 3.5–5.1)
PRO-BNP: 6159 PG/ML
PROCALCITONIN SERPL-MCNC: 0.09 NG/ML
RBC # BLD: 4.1 M/CU MM (ref 4.6–6.2)
SEGMENTED NEUTROPHILS ABSOLUTE COUNT: 4.7 K/CU MM
SODIUM BLD-SCNC: 142 MMOL/L (ref 135–145)
TOTAL IMMATURE NEUTOROPHIL: 0.07 K/CU MM
TOTAL NUCLEATED RBC: 0 K/CU MM
TOTAL PROTEIN: 6 GM/DL (ref 6.4–8.2)
WBC # BLD: 5.5 K/CU MM (ref 4–10.5)

## 2024-04-24 PROCEDURE — 6370000000 HC RX 637 (ALT 250 FOR IP): Performed by: STUDENT IN AN ORGANIZED HEALTH CARE EDUCATION/TRAINING PROGRAM

## 2024-04-24 PROCEDURE — 6370000000 HC RX 637 (ALT 250 FOR IP): Performed by: INTERNAL MEDICINE

## 2024-04-24 PROCEDURE — 2700000000 HC OXYGEN THERAPY PER DAY

## 2024-04-24 PROCEDURE — 83880 ASSAY OF NATRIURETIC PEPTIDE: CPT

## 2024-04-24 PROCEDURE — 36415 COLL VENOUS BLD VENIPUNCTURE: CPT

## 2024-04-24 PROCEDURE — 2580000003 HC RX 258: Performed by: STUDENT IN AN ORGANIZED HEALTH CARE EDUCATION/TRAINING PROGRAM

## 2024-04-24 PROCEDURE — 84100 ASSAY OF PHOSPHORUS: CPT

## 2024-04-24 PROCEDURE — 82962 GLUCOSE BLOOD TEST: CPT

## 2024-04-24 PROCEDURE — 84145 PROCALCITONIN (PCT): CPT

## 2024-04-24 PROCEDURE — 85025 COMPLETE CBC W/AUTO DIFF WBC: CPT

## 2024-04-24 PROCEDURE — 80053 COMPREHEN METABOLIC PANEL: CPT

## 2024-04-24 PROCEDURE — 83735 ASSAY OF MAGNESIUM: CPT

## 2024-04-24 PROCEDURE — 94640 AIRWAY INHALATION TREATMENT: CPT

## 2024-04-24 PROCEDURE — 94660 CPAP INITIATION&MGMT: CPT

## 2024-04-24 PROCEDURE — 82805 BLOOD GASES W/O2 SATURATION: CPT

## 2024-04-24 PROCEDURE — 94761 N-INVAS EAR/PLS OXIMETRY MLT: CPT

## 2024-04-24 RX ORDER — PREDNISONE 10 MG/1
TABLET ORAL
Qty: 30 TABLET | Refills: 0 | Status: SHIPPED | OUTPATIENT
Start: 2024-04-25

## 2024-04-24 RX ORDER — AZITHROMYCIN 250 MG/1
250 TABLET, FILM COATED ORAL DAILY
Qty: 3 TABLET | Refills: 0 | Status: SHIPPED | OUTPATIENT
Start: 2024-04-25 | End: 2024-04-28

## 2024-04-24 RX ORDER — PREDNISONE 10 MG/1
TABLET ORAL
Qty: 30 TABLET | Refills: 0 | Status: SHIPPED | OUTPATIENT
Start: 2024-04-25 | End: 2024-04-24 | Stop reason: HOSPADM

## 2024-04-24 RX ADMIN — EMPAGLIFLOZIN 10 MG: 10 TABLET, FILM COATED ORAL at 08:46

## 2024-04-24 RX ADMIN — AZITHROMYCIN DIHYDRATE 250 MG: 250 TABLET ORAL at 08:45

## 2024-04-24 RX ADMIN — BUPROPION HYDROCHLORIDE 150 MG: 150 TABLET, EXTENDED RELEASE ORAL at 08:45

## 2024-04-24 RX ADMIN — OXYCODONE AND ACETAMINOPHEN 1 TABLET: 7.5; 325 TABLET ORAL at 10:37

## 2024-04-24 RX ADMIN — IPRATROPIUM BROMIDE AND ALBUTEROL SULFATE 1 DOSE: 2.5; .5 SOLUTION RESPIRATORY (INHALATION) at 07:53

## 2024-04-24 RX ADMIN — CLOPIDOGREL BISULFATE 75 MG: 75 TABLET ORAL at 08:45

## 2024-04-24 RX ADMIN — BUDESONIDE AND FORMOTEROL FUMARATE DIHYDRATE 2 PUFF: 160; 4.5 AEROSOL RESPIRATORY (INHALATION) at 07:53

## 2024-04-24 RX ADMIN — AMLODIPINE BESYLATE 5 MG: 5 TABLET ORAL at 08:45

## 2024-04-24 RX ADMIN — TAMSULOSIN HYDROCHLORIDE 0.4 MG: 0.4 CAPSULE ORAL at 08:45

## 2024-04-24 RX ADMIN — SODIUM CHLORIDE, PRESERVATIVE FREE 10 ML: 5 INJECTION INTRAVENOUS at 08:47

## 2024-04-24 RX ADMIN — BUSPIRONE HYDROCHLORIDE 5 MG: 5 TABLET ORAL at 08:45

## 2024-04-24 RX ADMIN — GABAPENTIN 100 MG: 100 CAPSULE ORAL at 08:45

## 2024-04-24 RX ADMIN — CARVEDILOL 25 MG: 25 TABLET, FILM COATED ORAL at 08:46

## 2024-04-24 RX ADMIN — PREDNISONE 40 MG: 10 TABLET ORAL at 08:46

## 2024-04-24 RX ADMIN — OXYCODONE AND ACETAMINOPHEN 1 TABLET: 7.5; 325 TABLET ORAL at 01:54

## 2024-04-24 RX ADMIN — TORSEMIDE 20 MG: 20 TABLET ORAL at 08:55

## 2024-04-24 RX ADMIN — INSULIN LISPRO 1 UNITS: 100 INJECTION, SOLUTION INTRAVENOUS; SUBCUTANEOUS at 11:32

## 2024-04-24 RX ADMIN — IPRATROPIUM BROMIDE AND ALBUTEROL SULFATE 1 DOSE: 2.5; .5 SOLUTION RESPIRATORY (INHALATION) at 11:59

## 2024-04-24 RX ADMIN — ROSUVASTATIN CALCIUM 20 MG: 20 TABLET, COATED ORAL at 08:45

## 2024-04-24 RX ADMIN — INSULIN LISPRO 1 UNITS: 100 INJECTION, SOLUTION INTRAVENOUS; SUBCUTANEOUS at 08:55

## 2024-04-24 ASSESSMENT — PAIN SCALES - GENERAL
PAINLEVEL_OUTOF10: 8
PAINLEVEL_OUTOF10: 0

## 2024-04-24 ASSESSMENT — PAIN - FUNCTIONAL ASSESSMENT: PAIN_FUNCTIONAL_ASSESSMENT: ACTIVITIES ARE NOT PREVENTED

## 2024-04-24 ASSESSMENT — PAIN DESCRIPTION - ORIENTATION: ORIENTATION: MID

## 2024-04-24 ASSESSMENT — PAIN DESCRIPTION - DESCRIPTORS: DESCRIPTORS: ACHING;SHARP

## 2024-04-24 ASSESSMENT — PAIN DESCRIPTION - LOCATION: LOCATION: BACK

## 2024-04-24 NOTE — PROGRESS NOTES
Discharge instructions reviewed with pt at bedside. Pt discharged with 2 prescriptions from out-patient pharmacy.

## 2024-04-24 NOTE — PLAN OF CARE
Problem: Discharge Planning  Goal: Discharge to home or other facility with appropriate resources  Outcome: Adequate for Discharge     Problem: Pain  Goal: Verbalizes/displays adequate comfort level or baseline comfort level  Outcome: Adequate for Discharge     Problem: Safety - Adult  Goal: Free from fall injury  Outcome: Adequate for Discharge     Problem: Skin/Tissue Integrity  Goal: Absence of new skin breakdown  Description: 1.  Monitor for areas of redness and/or skin breakdown  2.  Assess vascular access sites hourly  3.  Every 4-6 hours minimum:  Change oxygen saturation probe site  4.  Every 4-6 hours:  If on nasal continuous positive airway pressure, respiratory therapy assess nares and determine need for appliance change or resting period.  Outcome: Adequate for Discharge     Problem: Neurosensory - Adult  Goal: Achieves stable or improved neurological status  Outcome: Adequate for Discharge  Goal: Achieves maximal functionality and self care  Outcome: Adequate for Discharge     Problem: Respiratory - Adult  Goal: Achieves optimal ventilation and oxygenation  Outcome: Adequate for Discharge     Problem: Cardiovascular - Adult  Goal: Maintains optimal cardiac output and hemodynamic stability  Outcome: Adequate for Discharge  Goal: Absence of cardiac dysrhythmias or at baseline  Outcome: Adequate for Discharge     Problem: Skin/Tissue Integrity - Adult  Goal: Skin integrity remains intact  Outcome: Adequate for Discharge  Goal: Incisions, wounds, or drain sites healing without S/S of infection  Outcome: Adequate for Discharge  Goal: Oral mucous membranes remain intact  Outcome: Adequate for Discharge     Problem: Musculoskeletal - Adult  Goal: Return mobility to safest level of function  Outcome: Adequate for Discharge  Goal: Return ADL status to a safe level of function  Outcome: Adequate for Discharge     Problem: Gastrointestinal - Adult  Goal: Minimal or absence of nausea and vomiting  Outcome:

## 2024-04-24 NOTE — PROGRESS NOTES
Pulmonary and Critical Care  Progress Note    Subjective:   The patient has improved.  Shortness of breath has improved  Chest pain none  Addressing respiratory complaints Patient is negative for  hemoptysis and cyanosis  CONSTITUTIONAL:  negative for fevers and chills      Past Medical History:     has a past medical history of Arthritis, COPD (chronic obstructive pulmonary disease) (HCC), Diabetes mellitus (HCC), History of blood transfusion, Hyperlipidemia, Hypertension, and Proteinuria.   has a past surgical history that includes shoulder surgery (Right, 1990's); Tonsillectomy (as a kid); Colonoscopy (2016); Cholecystectomy (2016); Endoscopy, colon, diagnostic (2016); Cardiac catheterization; joint replacement (Right, 2000); and hernia repair.   reports that he quit smoking about 6 years ago. His smoking use included cigarettes. He started smoking about 56 years ago. He has a 50.0 pack-year smoking history. He has never used smokeless tobacco. He reports current alcohol use. He reports that he does not use drugs.  Family history:  family history includes Cancer in his father; Heart Disease in his mother; High Blood Pressure in his mother.    No Known Allergies  Social History:    Reviewed; no changes    Objective:   PHYSICAL EXAM:        VITALS:  BP (!) 145/73   Pulse 80   Temp 97.9 °F (36.6 °C) (Oral)   Resp 19   Ht 1.854 m (6' 1\")   Wt 130.5 kg (287 lb 11.2 oz)   SpO2 96%   BMI 37.96 kg/m²     24HR INTAKE/OUTPUT:    Intake/Output Summary (Last 24 hours) at 4/24/2024 1039  Last data filed at 4/24/2024 0813  Gross per 24 hour   Intake 720 ml   Output 800 ml   Net -80 ml       CONSTITUTIONAL:  awake, alert, cooperative, no apparent distress, and appears stated age  LUNGS:  Decreased BS.  CARDIOVASCULAR:  normal S1 and S2 and positive JVD  ABD:Abdomen soft, non-tender. BS normal. No masses,  No organomegaly  NEURO:Alert and oriented x3. Gait normal. Reflexes and motor strength normal and symmetric. Cranial  nerves 2-12 and sensation grossly intact.  DATA:    CBC:  Recent Labs     04/22/24  1029 04/23/24  0152 04/24/24  0141   WBC 7.2 6.2 5.5   RBC 4.22* 3.80* 4.10*   HGB 12.9* 11.4* 12.2*   HCT 41.2* 35.8* 37.3*   * 159 127*   MCV 97.6 94.2 91.0   MCH 30.6 30.0 29.8   MCHC 31.3* 31.8* 32.7   RDW 12.3 12.2 12.0      BMP:  Recent Labs     04/22/24  1029 04/23/24  0152 04/24/24  0141    144 142   K 5.1 4.3 4.7    106 103   CO2 29 31 28   BUN 37* 24* 20   CREATININE 1.6* 1.1 1.1   CALCIUM 8.5 8.2* 8.7   GLUCOSE 207* 120* 221*      ABG:  No results for input(s): \"PH\", \"PO2ART\", \"XDN9LXP\", \"HCO3\", \"BEART\", \"O2SAT\" in the last 72 hours.    Lab Results   Component Value Date    PROBNP 6,159 (H) 04/24/2024    PROBNP 7,931 (H) 04/21/2024    PROBNP 4,287 (H) 07/30/2023     No results found for: \"CULTRESP\"    Radiology Review:  Pertinent images / reports were reviewed as a part of this visit.    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 Overall the patient has improved.  2. Wean FiO2.  3. Inc. Activity.  4. Inc. Bipap to 20/8,his home settings.  Donaldo Clay MD  4/24/2024  10:39 AM

## 2024-04-24 NOTE — PROGRESS NOTES
Outpatient Pharmacy Progress Note for Meds-to-Beds    Total number of Prescriptions Filled: 2    Additional Documentation:  Patient picked-up the medication(s) in the OP Pharmacy      Thank you for letting us serve your patients.  [address] [address]    Phone: [phone]    Fax: [phone]

## 2024-04-24 NOTE — PROGRESS NOTES
Nephrology Progress Note  4/24/2024 1:15 PM  Subjective:     Interval History: Dougie Crouch is a 69 y.o. male  who appears to be insulin but I do spoke with the patient's sister as well as patient and he is goal is still to go home        Data:   Scheduled Meds:   predniSONE  40 mg Oral Daily    ipratropium 0.5 mg-albuterol 2.5 mg  1 Dose Inhalation Q4H WA RT    azithromycin  250 mg Oral Daily    amLODIPine  5 mg Oral Daily    busPIRone  5 mg Oral BID    buPROPion  150 mg Oral QAM    carvedilol  25 mg Oral BID WC    clopidogrel  75 mg Oral Daily    gabapentin  100 mg Oral TID    rosuvastatin  20 mg Oral Daily    [Held by provider] sacubitril-valsartan  1 tablet Oral BID    empagliflozin  10 mg Oral Daily    tamsulosin  0.4 mg Oral Daily    torsemide  20 mg Oral Daily    sodium chloride flush  5-40 mL IntraVENous 2 times per day    heparin (porcine)  5,000 Units SubCUTAneous 3 times per day    budesonide-formoterol  2 puff Inhalation BID RT    insulin lispro  0-4 Units SubCUTAneous TID WC    insulin lispro  0-4 Units SubCUTAneous Nightly     Continuous Infusions:   sodium chloride      dextrose           CBC   Recent Labs     04/22/24  1029 04/23/24  0152 04/24/24  0141   WBC 7.2 6.2 5.5   HGB 12.9* 11.4* 12.2*   HCT 41.2* 35.8* 37.3*   * 159 127*      BMP   Recent Labs     04/22/24  1029 04/23/24  0152 04/24/24  0141    144 142   K 5.1 4.3 4.7    106 103   CO2 29 31 28   PHOS  --   --  1.7*   BUN 37* 24* 20   CREATININE 1.6* 1.1 1.1     Hepatic:   Recent Labs     04/22/24  1029 04/23/24  0152 04/24/24  0141   AST 14* 11* 13*   ALT 15 12 13   BILITOT 0.4 0.4 0.5   ALKPHOS 109 89 100     Troponin: No results for input(s): \"TROPONINI\" in the last 72 hours.  BNP: No results for input(s): \"BNP\" in the last 72 hours.  Lipids: No results for input(s): \"CHOL\", \"HDL\" in the last 72 hours.    Invalid input(s): \"LDLCALCU\"  ABGs:   Lab Results   Component Value Date/Time    PO2ART 86 07/27/2023 09:00 AM     130.5 kg (287 lb 11.2 oz)   SpO2 95%   BMI 37.96 kg/m²  {  General appearance: awake weak  HEENT: Head: Normal, normocephalic, atraumatic.  Neck: supple, symmetrical, trachea midline  Lungs: diminished breath sounds bilaterally  Heart: S1, S2 normal  Abdomen: abnormal findings:  soft nt obese  Extremities: edema +  Neurologic: Mental status: alertness:  awake more interactive today        Assessment and Plan:      IMP:  #1 acute renal failure?  CKD  #2 hyperkalemia  #3 hypercapnic respiratory failure with COPD/falls  #4 metabolic encephalopathy  #5 coronary disease prior PCI EF 30 to 35%  #6 type 2 diabetes    Plan      #1 creatinine holding 1.1 improved to baseline supportive care monitor urine output   #2 potassium improved   #3 monitor oxygenation supportive care   #4 monitor affect somewhat better   #5 maintain Jardiance and torsemide and hold on the Entresto for now   #6 monitor glucose   supportive care try to work on home health care for home if he refuses skilled nursing             JOSE ALEJANDRO DAS MD, MD

## 2024-05-02 ENCOUNTER — HOSPITAL ENCOUNTER (OUTPATIENT)
Dept: PULMONOLOGY | Age: 69
Discharge: HOME | End: 2024-05-02
Attending: INTERNAL MEDICINE
Payer: MEDICARE

## 2024-05-02 DIAGNOSIS — I50.812 CHRONIC RIGHT-SIDED HEART FAILURE (HCC): ICD-10-CM

## 2024-05-02 DIAGNOSIS — Z92.89 HISTORY OF BILEVEL POSITIVE AIRWAY PRESSURE (BIPAP) THERAPY: ICD-10-CM

## 2024-05-02 DIAGNOSIS — J96.21 ACUTE ON CHRONIC RESPIRATORY FAILURE WITH HYPOXEMIA (HCC): ICD-10-CM

## 2024-05-02 DIAGNOSIS — J44.9 STAGE 4 VERY SEVERE COPD BY GOLD CLASSIFICATION (HCC): ICD-10-CM

## 2024-05-02 DIAGNOSIS — J92.0 CALCIFIED PLEURAL PLAQUE DUE TO ASBESTOS EXPOSURE: ICD-10-CM

## 2024-05-02 DIAGNOSIS — I27.20 PULMONARY HYPERTENSION (HCC): ICD-10-CM

## 2024-05-02 DIAGNOSIS — J43.2 CENTRILOBULAR EMPHYSEMA (HCC): ICD-10-CM

## 2024-05-02 DIAGNOSIS — Z87.891 FORMER SMOKER: ICD-10-CM

## 2024-05-02 DIAGNOSIS — R06.02 SHORTNESS OF BREATH: ICD-10-CM

## 2024-05-02 DIAGNOSIS — I27.81 COR PULMONALE (CHRONIC) (HCC): ICD-10-CM

## 2024-05-02 PROCEDURE — 94010 BREATHING CAPACITY TEST: CPT

## 2024-05-02 NOTE — PROGRESS NOTES
Called Dr Smith's office spoke to Milagros, RN  Pt has home o2 at 4lpm 24/7  Pt uses Bipap with O2 bleed in.  Milagros could not find what Dr. Smith wanted us to do, from reading his notes.  Patient went home.

## 2024-05-06 PROBLEM — I10 PRIMARY HYPERTENSION: Status: ACTIVE | Noted: 2024-05-06

## 2024-05-28 ENCOUNTER — OFFICE VISIT (OUTPATIENT)
Dept: PULMONOLOGY | Age: 69
End: 2024-05-28
Payer: MEDICARE

## 2024-05-28 VITALS
HEIGHT: 73 IN | WEIGHT: 305.4 LBS | DIASTOLIC BLOOD PRESSURE: 88 MMHG | SYSTOLIC BLOOD PRESSURE: 138 MMHG | BODY MASS INDEX: 40.48 KG/M2 | HEART RATE: 73 BPM | OXYGEN SATURATION: 92 %

## 2024-05-28 DIAGNOSIS — Z92.89 HISTORY OF BILEVEL POSITIVE AIRWAY PRESSURE (BIPAP) THERAPY: ICD-10-CM

## 2024-05-28 DIAGNOSIS — R06.02 SHORTNESS OF BREATH: ICD-10-CM

## 2024-05-28 DIAGNOSIS — I27.20 PULMONARY HYPERTENSION (HCC): ICD-10-CM

## 2024-05-28 DIAGNOSIS — J96.11 CHRONIC HYPOXEMIC RESPIRATORY FAILURE (HCC): ICD-10-CM

## 2024-05-28 DIAGNOSIS — G47.31 COMPLEX SLEEP APNEA SYNDROME: ICD-10-CM

## 2024-05-28 DIAGNOSIS — Z87.891 FORMER SMOKER: ICD-10-CM

## 2024-05-28 DIAGNOSIS — J44.9 STAGE 4 VERY SEVERE COPD BY GOLD CLASSIFICATION (HCC): Primary | ICD-10-CM

## 2024-05-28 DIAGNOSIS — J92.0 CALCIFIED PLEURAL PLAQUE DUE TO ASBESTOS EXPOSURE: ICD-10-CM

## 2024-05-28 DIAGNOSIS — I50.812 CHRONIC RIGHT-SIDED HEART FAILURE (HCC): ICD-10-CM

## 2024-05-28 PROBLEM — G47.39 COMPLEX SLEEP APNEA SYNDROME: Status: ACTIVE | Noted: 2024-05-28

## 2024-05-28 PROCEDURE — G8417 CALC BMI ABV UP PARAM F/U: HCPCS | Performed by: INTERNAL MEDICINE

## 2024-05-28 PROCEDURE — 1036F TOBACCO NON-USER: CPT | Performed by: INTERNAL MEDICINE

## 2024-05-28 PROCEDURE — 3017F COLORECTAL CA SCREEN DOC REV: CPT | Performed by: INTERNAL MEDICINE

## 2024-05-28 PROCEDURE — G8427 DOCREV CUR MEDS BY ELIG CLIN: HCPCS | Performed by: INTERNAL MEDICINE

## 2024-05-28 PROCEDURE — 3023F SPIROM DOC REV: CPT | Performed by: INTERNAL MEDICINE

## 2024-05-28 PROCEDURE — 3079F DIAST BP 80-89 MM HG: CPT | Performed by: INTERNAL MEDICINE

## 2024-05-28 PROCEDURE — 3075F SYST BP GE 130 - 139MM HG: CPT | Performed by: INTERNAL MEDICINE

## 2024-05-28 PROCEDURE — 1123F ACP DISCUSS/DSCN MKR DOCD: CPT | Performed by: INTERNAL MEDICINE

## 2024-05-28 PROCEDURE — 99213 OFFICE O/P EST LOW 20 MIN: CPT | Performed by: INTERNAL MEDICINE

## 2024-05-28 RX ORDER — FLUTICASONE FUROATE, UMECLIDINIUM BROMIDE AND VILANTEROL TRIFENATATE 100; 62.5; 25 UG/1; UG/1; UG/1
2 POWDER RESPIRATORY (INHALATION) 2 TIMES DAILY
COMMUNITY

## 2024-05-28 RX ORDER — FENOFIBRATE 160 MG/1
160 TABLET ORAL DAILY
COMMUNITY
Start: 2024-05-21

## 2024-05-28 NOTE — PROGRESS NOTES
symptoms they should call office for further instructions.    Return in about 4 months (around 9/28/2024) for Recheck for COPD, Complex sleep apnea, Recheck for congestive heart failure.      This dictation was performed with a verbal recognition program and it was checked for errors.  It is possible that there are still dictated errors within this office note.  Any errors should be brought immediately to my attention for correction.  All efforts were made to ensure that this office note is accurate.

## 2024-07-31 PROBLEM — N18.32 STAGE 3B CHRONIC KIDNEY DISEASE (HCC): Status: ACTIVE | Noted: 2024-07-31

## 2024-07-31 PROBLEM — E11.9 TYPE 2 DIABETES MELLITUS WITHOUT COMPLICATION, WITHOUT LONG-TERM CURRENT USE OF INSULIN (HCC): Status: ACTIVE | Noted: 2024-07-31

## 2024-07-31 PROBLEM — R80.1 PERSISTENT PROTEINURIA: Status: ACTIVE | Noted: 2024-07-31

## 2024-10-04 ENCOUNTER — OFFICE VISIT (OUTPATIENT)
Dept: PULMONOLOGY | Age: 69
End: 2024-10-04

## 2024-10-04 VITALS
SYSTOLIC BLOOD PRESSURE: 126 MMHG | HEIGHT: 73 IN | BODY MASS INDEX: 36.42 KG/M2 | DIASTOLIC BLOOD PRESSURE: 86 MMHG | HEART RATE: 89 BPM | WEIGHT: 274.8 LBS | OXYGEN SATURATION: 88 %

## 2024-10-04 DIAGNOSIS — J92.0 CALCIFIED PLEURAL PLAQUE DUE TO ASBESTOS EXPOSURE: ICD-10-CM

## 2024-10-04 DIAGNOSIS — J44.9 STAGE 4 VERY SEVERE COPD BY GOLD CLASSIFICATION (HCC): Primary | ICD-10-CM

## 2024-10-04 DIAGNOSIS — G47.39 COMPLEX SLEEP APNEA SYNDROME: ICD-10-CM

## 2024-10-04 DIAGNOSIS — Z87.891 FORMER SMOKER: ICD-10-CM

## 2024-10-04 DIAGNOSIS — I27.20 PULMONARY HYPERTENSION (HCC): ICD-10-CM

## 2024-10-04 DIAGNOSIS — J96.11 CHRONIC HYPOXEMIC RESPIRATORY FAILURE: ICD-10-CM

## 2024-10-04 DIAGNOSIS — R06.02 SHORTNESS OF BREATH: ICD-10-CM

## 2024-10-04 DIAGNOSIS — J43.2 CENTRILOBULAR EMPHYSEMA (HCC): ICD-10-CM

## 2024-10-04 NOTE — PROGRESS NOTES
SUBJECTIVE:  Chief Complaint: Stage IV/very severe COPD, complex sleep apnea on BiPAP, cor pulmonale/right heart failure, chronic hypoxemic respiratory failure, asbestos related pleural plaques, pulmonary hypertension, former smoker  Dougie states that he has had no recent episodes of bronchitis or worsening shortness of breath and denies chest pain or chest discomfort he continues note dyspnea with exertion but typically is not short of breath at rest.  He states that he continues to wear BiPAP around 6 hours per night and gets a good clinical benefit from its use with fairly good daytime energy and minimal daytime sleepiness.  He states that he is not wearing his oxygen during the day or at nighttime and that he has been checking himself frequently and his oximetry stays well above 90% on room air.  He mentions that he is not using oxygen with his BiPAP at nighttime.  He continues on Trelegy 100   1 inhalation daily but states that he is in the donut hole and is having a hard time paying for Trelegy and is asked for samples.      ROS:  Constitution:  HEENT: Negative for ear, throat pain  Cardiovascular: Negative for chest pain, syncope, edema  Pulmonary: See HPI  Musculoskeletal: Negative for DVT, myalgias, arthralgias    OBJECTIVE:  /86   Pulse 89   Ht 1.854 m (6' 1\")   Wt 124.6 kg (274 lb 12.8 oz)   SpO2 (!) 88%   BMI 36.26 kg/m²      Physical Exam:  Constitutional:  He appears well developed and well-nourished.  In no respiratory distress at rest.  Neck:  Supple, No palpable lymphadenopathy, No JVD  Cardiovascular:  S1, S2 Normal, Regular rhythm, no murmurs or gallops, No pericardial  rubs.  Pulmonary:  moderately diminished breath sounds throughout all lung areas but I do not hear wheezing or rhonchi.  No rales are heard at the lung bases  Abdomen: Not examined  Extremities: Very minimal peripheral edema, No DVT  Neurologic: Oriented x3, No focal deficits    Radiology: Chest x-ray on 4/23/2024 showed

## 2024-11-12 NOTE — FLOWSHEET NOTE
To holding area. Assessment completed and questions answered. Call light in reach.
12-Nov-2024 17:23

## 2024-12-12 PROBLEM — E66.3 OVERWEIGHT: Status: ACTIVE | Noted: 2024-12-12

## 2025-02-13 PROBLEM — N18.31 STAGE 3A CHRONIC KIDNEY DISEASE (HCC): Status: ACTIVE | Noted: 2025-02-13

## 2025-04-01 ENCOUNTER — OFFICE VISIT (OUTPATIENT)
Dept: PULMONOLOGY | Age: 70
End: 2025-04-01
Payer: MEDICARE

## 2025-04-01 VITALS
BODY MASS INDEX: 34.72 KG/M2 | WEIGHT: 262 LBS | HEART RATE: 102 BPM | DIASTOLIC BLOOD PRESSURE: 76 MMHG | HEIGHT: 73 IN | OXYGEN SATURATION: 88 % | SYSTOLIC BLOOD PRESSURE: 138 MMHG

## 2025-04-01 DIAGNOSIS — J44.9 STAGE 4 VERY SEVERE COPD BY GOLD CLASSIFICATION (HCC): Primary | ICD-10-CM

## 2025-04-01 DIAGNOSIS — I27.20 PULMONARY HYPERTENSION (HCC): ICD-10-CM

## 2025-04-01 DIAGNOSIS — G47.39 COMPLEX SLEEP APNEA SYNDROME: ICD-10-CM

## 2025-04-01 DIAGNOSIS — J96.11 CHRONIC HYPOXEMIC RESPIRATORY FAILURE: ICD-10-CM

## 2025-04-01 DIAGNOSIS — J43.2 CENTRILOBULAR EMPHYSEMA (HCC): ICD-10-CM

## 2025-04-01 DIAGNOSIS — Z87.891 FORMER SMOKER: ICD-10-CM

## 2025-04-01 DIAGNOSIS — G47.31 CENTRAL SLEEP APNEA: ICD-10-CM

## 2025-04-01 DIAGNOSIS — R06.02 SHORTNESS OF BREATH: ICD-10-CM

## 2025-04-01 DIAGNOSIS — I50.812 CHRONIC RIGHT-SIDED HEART FAILURE (HCC): ICD-10-CM

## 2025-04-01 DIAGNOSIS — G47.33 OBSTRUCTIVE SLEEP APNEA: ICD-10-CM

## 2025-04-01 DIAGNOSIS — J92.0 CALCIFIED PLEURAL PLAQUE DUE TO ASBESTOS EXPOSURE: ICD-10-CM

## 2025-04-01 PROCEDURE — 3075F SYST BP GE 130 - 139MM HG: CPT | Performed by: INTERNAL MEDICINE

## 2025-04-01 PROCEDURE — 3078F DIAST BP <80 MM HG: CPT | Performed by: INTERNAL MEDICINE

## 2025-04-01 PROCEDURE — 1160F RVW MEDS BY RX/DR IN RCRD: CPT | Performed by: INTERNAL MEDICINE

## 2025-04-01 PROCEDURE — G8417 CALC BMI ABV UP PARAM F/U: HCPCS | Performed by: INTERNAL MEDICINE

## 2025-04-01 PROCEDURE — 99213 OFFICE O/P EST LOW 20 MIN: CPT | Performed by: INTERNAL MEDICINE

## 2025-04-01 PROCEDURE — 3023F SPIROM DOC REV: CPT | Performed by: INTERNAL MEDICINE

## 2025-04-01 PROCEDURE — G8427 DOCREV CUR MEDS BY ELIG CLIN: HCPCS | Performed by: INTERNAL MEDICINE

## 2025-04-01 PROCEDURE — 1036F TOBACCO NON-USER: CPT | Performed by: INTERNAL MEDICINE

## 2025-04-01 PROCEDURE — 1123F ACP DISCUSS/DSCN MKR DOCD: CPT | Performed by: INTERNAL MEDICINE

## 2025-04-01 PROCEDURE — 1159F MED LIST DOCD IN RCRD: CPT | Performed by: INTERNAL MEDICINE

## 2025-04-01 PROCEDURE — 3017F COLORECTAL CA SCREEN DOC REV: CPT | Performed by: INTERNAL MEDICINE

## 2025-04-01 RX ORDER — ZOLPIDEM TARTRATE 10 MG/1
10 TABLET ORAL NIGHTLY PRN
Qty: 1 TABLET | Refills: 0 | Status: SHIPPED | OUTPATIENT
Start: 2025-04-01 | End: 2025-04-02

## 2025-04-01 RX ORDER — FENTANYL 12.5 UG/1
1 PATCH TRANSDERMAL
COMMUNITY
Start: 2025-03-05

## 2025-04-01 NOTE — PROGRESS NOTES
SUBJECTIVE:  Chief Complaint: Severe complex sleep apnea, central sleep apnea, obstructive sleep apnea, very severe COPD, chronic hypoxemic respiratory failure, right heart failure, asbestos related pleural plaques, pulmonary hypertension, former smoker, shortness of breath  Dougie states that he continues to wear BiPAP nightly but still is complaining of tiredness, sleepiness and shortness of breath.  He is wearing BiPAP around 6 hours per night.  Unfortunately his BiPAP machine is not transmitting and we are not able to do a compliance report check on him presently.  He continues to wear oxygen on and off during the day and with ambulation but typically does not use it every night.  He continues on Trelegy 100 1 inhalation daily along with his albuterol rescue inhaler but states that he cannot afford Trelegy.  I have given him multiple samples of Trelegy in the past and did recommend other combination of inhaled medications for maintenance therapy.  He continues to be in mild chronic congestive heart failure and right heart failure with some ankle edema.  However he states that his shortness of breath has not gotten significantly worse recently.  He has had no episodes of bronchitis recently.      ROS:  Constitution:  HEENT: Negative for ear, throat pain  Cardiovascular: Negative for chest pain, syncope, edema  Pulmonary: See HPI  Musculoskeletal: Negative for DVT, myalgias, arthralgias    OBJECTIVE:  /76   Pulse (!) 102   Ht 1.854 m (6' 1\")   Wt 118.8 kg (262 lb)   SpO2 (!) 88% Comment: on 2lpm pulse, pt refused conc  BMI 34.57 kg/m²    Mallampati-1  Arrey sleepiness scale-9  Neck circumference 21.5 inches  Physical Exam:  Constitutional:  He appears well developed and well-nourished.  Wearing nasal oxygen.  Does not appear to be in respiratory distress at rest.  Neck:  Supple, No palpable lymphadenopathy, No JVD, increased neck girth  Cardiovascular:  S1, S2 Normal, Regular rhythm, no murmurs or

## 2025-04-15 ENCOUNTER — HOSPITAL ENCOUNTER (OUTPATIENT)
Dept: CT IMAGING | Age: 70
Discharge: HOME | End: 2025-04-15
Attending: INTERNAL MEDICINE
Payer: MEDICARE

## 2025-04-15 DIAGNOSIS — J44.9 STAGE 4 VERY SEVERE COPD BY GOLD CLASSIFICATION (HCC): ICD-10-CM

## 2025-04-15 DIAGNOSIS — J96.11 CHRONIC HYPOXEMIC RESPIRATORY FAILURE: ICD-10-CM

## 2025-04-15 DIAGNOSIS — Z87.891 FORMER SMOKER: ICD-10-CM

## 2025-04-15 DIAGNOSIS — J92.0 CALCIFIED PLEURAL PLAQUE DUE TO ASBESTOS EXPOSURE: ICD-10-CM

## 2025-04-15 DIAGNOSIS — I27.20 PULMONARY HYPERTENSION (HCC): ICD-10-CM

## 2025-04-15 DIAGNOSIS — J43.2 CENTRILOBULAR EMPHYSEMA (HCC): ICD-10-CM

## 2025-04-15 DIAGNOSIS — G47.39 COMPLEX SLEEP APNEA SYNDROME: ICD-10-CM

## 2025-04-15 DIAGNOSIS — R06.02 SHORTNESS OF BREATH: ICD-10-CM

## 2025-04-15 PROCEDURE — 71250 CT THORAX DX C-: CPT

## 2025-04-17 ENCOUNTER — TELEPHONE (OUTPATIENT)
Dept: PULMONOLOGY | Age: 70
End: 2025-04-17

## 2025-04-17 NOTE — TELEPHONE ENCOUNTER
Per sleep lab, they need baseline SS for patient as the one that was done in 2024 shows no KASSIE. Per Epic notes, patient had one done in 2020 with Dr. Clay's office. Called their office and spoke with Emy, she is going to look for his physical chart to see if they have a copy of it and if they do she will fax it to us.

## 2025-04-23 ENCOUNTER — TELEPHONE (OUTPATIENT)
Dept: PULMONOLOGY | Age: 70
End: 2025-04-23

## 2025-04-23 DIAGNOSIS — Z92.89 HISTORY OF BILEVEL POSITIVE AIRWAY PRESSURE (BIPAP) THERAPY: ICD-10-CM

## 2025-04-23 DIAGNOSIS — J96.11 CHRONIC HYPOXEMIC RESPIRATORY FAILURE (HCC): ICD-10-CM

## 2025-04-23 DIAGNOSIS — J43.2 CENTRILOBULAR EMPHYSEMA (HCC): ICD-10-CM

## 2025-04-23 DIAGNOSIS — I50.812 CHRONIC RIGHT-SIDED HEART FAILURE (HCC): ICD-10-CM

## 2025-04-23 DIAGNOSIS — G47.39 COMPLEX SLEEP APNEA SYNDROME: Primary | ICD-10-CM

## 2025-04-23 DIAGNOSIS — I27.20 PULMONARY HYPERTENSION (HCC): ICD-10-CM

## 2025-04-23 DIAGNOSIS — G47.19 EXCESSIVE DAYTIME SLEEPINESS: ICD-10-CM

## 2025-04-23 DIAGNOSIS — R06.02 SHORTNESS OF BREATH: ICD-10-CM

## 2025-04-23 NOTE — TELEPHONE ENCOUNTER
Please place new order for sleep study for patient. Per Thelma in the sleep lab, the sleep study needs to be ordered for a BiPAP with an KASSIE diagnosis due to the patients EF being 35%

## 2025-04-24 ENCOUNTER — RESULTS FOLLOW-UP (OUTPATIENT)
Dept: PULMONOLOGY | Age: 70
End: 2025-04-24

## 2025-04-24 PROBLEM — G47.19 EXCESSIVE DAYTIME SLEEPINESS: Status: ACTIVE | Noted: 2025-04-24

## 2025-04-24 NOTE — RESULT ENCOUNTER NOTE
I spoke to Mr. Crouch over the phone and told him that his most recent low-dose CT lung screen shows no suspicious lung nodules or masses.  There is pleural thickening probably secondary to his prior exposure to asbestos and he has significant emphysema.  I will reorder his repeat sleep study for BiPAP titration.  He is not a candidate for ASV because of a low ejection fraction.

## 2025-05-06 NOTE — PROGRESS NOTES
LM with my call-back # concerning  surgery @ [facility] on  5/14/25.  Please call the PAT Nurse for a phone assessment and surgery instructions.

## 2025-05-14 ENCOUNTER — HOSPITAL ENCOUNTER (OUTPATIENT)
Dept: CT IMAGING | Age: 70
Discharge: HOME | End: 2025-05-14
Payer: MEDICARE

## 2025-05-14 VITALS
DIASTOLIC BLOOD PRESSURE: 81 MMHG | RESPIRATION RATE: 21 BRPM | SYSTOLIC BLOOD PRESSURE: 127 MMHG | OXYGEN SATURATION: 95 % | HEIGHT: 73 IN | HEART RATE: 76 BPM | BODY MASS INDEX: 39.76 KG/M2 | WEIGHT: 300 LBS

## 2025-05-14 DIAGNOSIS — I50.812 CHRONIC RIGHT-SIDED HEART FAILURE (HCC): ICD-10-CM

## 2025-05-14 DIAGNOSIS — R94.5 ABNORMAL RESULTS OF LIVER FUNCTION STUDIES: ICD-10-CM

## 2025-05-14 DIAGNOSIS — J43.2 CENTRILOBULAR EMPHYSEMA (HCC): ICD-10-CM

## 2025-05-14 DIAGNOSIS — I10 PRIMARY HYPERTENSION: ICD-10-CM

## 2025-05-14 DIAGNOSIS — E11.9 TYPE 2 DIABETES MELLITUS WITHOUT COMPLICATION, WITHOUT LONG-TERM CURRENT USE OF INSULIN (HCC): ICD-10-CM

## 2025-05-14 DIAGNOSIS — N18.32 STAGE 3B CHRONIC KIDNEY DISEASE (HCC): ICD-10-CM

## 2025-05-14 DIAGNOSIS — Z98.61 CAD S/P PERCUTANEOUS CORONARY ANGIOPLASTY: ICD-10-CM

## 2025-05-14 DIAGNOSIS — E87.5 HYPERKALEMIA: ICD-10-CM

## 2025-05-14 DIAGNOSIS — N18.31 STAGE 3A CHRONIC KIDNEY DISEASE (HCC): ICD-10-CM

## 2025-05-14 DIAGNOSIS — I25.10 CAD S/P PERCUTANEOUS CORONARY ANGIOPLASTY: ICD-10-CM

## 2025-05-14 DIAGNOSIS — E66.3 OVERWEIGHT: ICD-10-CM

## 2025-05-14 DIAGNOSIS — R80.1 PERSISTENT PROTEINURIA: ICD-10-CM

## 2025-05-14 LAB
ERYTHROCYTE [DISTWIDTH] IN BLOOD BY AUTOMATED COUNT: 13.2 % (ref 11.7–14.9)
HCT VFR BLD AUTO: 47.6 % (ref 42–52)
HGB BLD-MCNC: 15.5 G/DL (ref 13.5–18)
INR PPP: 0.9
MCH RBC QN AUTO: 29.1 PG (ref 27–31)
MCHC RBC AUTO-ENTMCNC: 32.6 G/DL (ref 32–36)
MCV RBC AUTO: 89.3 FL (ref 78–100)
PARTIAL THROMBOPLASTIN TIME: 30.8 SEC (ref 25.1–37.1)
PLATELET # BLD AUTO: 171 K/UL (ref 140–440)
PMV BLD AUTO: 10.1 FL (ref 7.5–11.1)
PROTHROMBIN TIME: 12.7 SEC (ref 11.7–14.5)
RBC # BLD AUTO: 5.33 M/UL (ref 4.6–6.2)
WBC OTHER # BLD: 5.6 K/UL (ref 4–10.5)

## 2025-05-14 PROCEDURE — 85610 PROTHROMBIN TIME: CPT

## 2025-05-14 PROCEDURE — 6360000002 HC RX W HCPCS: Performed by: RADIOLOGY

## 2025-05-14 PROCEDURE — 50200 RENAL BIOPSY PERQ: CPT | Performed by: RADIOLOGY

## 2025-05-14 PROCEDURE — 85730 THROMBOPLASTIN TIME PARTIAL: CPT

## 2025-05-14 PROCEDURE — 88305 TISSUE EXAM BY PATHOLOGIST: CPT | Performed by: PATHOLOGY

## 2025-05-14 PROCEDURE — 77012 CT SCAN FOR NEEDLE BIOPSY: CPT | Performed by: RADIOLOGY

## 2025-05-14 PROCEDURE — 77012 CT SCAN FOR NEEDLE BIOPSY: CPT

## 2025-05-14 PROCEDURE — 99152 MOD SED SAME PHYS/QHP 5/>YRS: CPT | Performed by: RADIOLOGY

## 2025-05-14 PROCEDURE — 85027 COMPLETE CBC AUTOMATED: CPT

## 2025-05-14 RX ORDER — DIPHENHYDRAMINE HYDROCHLORIDE 50 MG/ML
INJECTION, SOLUTION INTRAMUSCULAR; INTRAVENOUS PRN
Status: COMPLETED | OUTPATIENT
Start: 2025-05-14 | End: 2025-05-14

## 2025-05-14 RX ORDER — MIDAZOLAM HYDROCHLORIDE 5 MG/ML
INJECTION, SOLUTION INTRAMUSCULAR; INTRAVENOUS PRN
Status: COMPLETED | OUTPATIENT
Start: 2025-05-14 | End: 2025-05-14

## 2025-05-14 RX ORDER — FENTANYL CITRATE 50 UG/ML
INJECTION, SOLUTION INTRAMUSCULAR; INTRAVENOUS PRN
Status: COMPLETED | OUTPATIENT
Start: 2025-05-14 | End: 2025-05-14

## 2025-05-14 RX ORDER — LABETALOL HYDROCHLORIDE 5 MG/ML
INJECTION, SOLUTION INTRAVENOUS PRN
Status: COMPLETED | OUTPATIENT
Start: 2025-05-14 | End: 2025-05-14

## 2025-05-14 RX ADMIN — FENTANYL CITRATE 50 MCG: 50 INJECTION, SOLUTION INTRAMUSCULAR; INTRAVENOUS at 09:22

## 2025-05-14 RX ADMIN — LABETALOL HYDROCHLORIDE 10 MG: 5 INJECTION, SOLUTION INTRAVENOUS at 09:27

## 2025-05-14 RX ADMIN — LABETALOL HYDROCHLORIDE 5 MG: 5 INJECTION, SOLUTION INTRAVENOUS at 09:32

## 2025-05-14 RX ADMIN — DIPHENHYDRAMINE HYDROCHLORIDE 25 MG: 50 INJECTION, SOLUTION INTRAMUSCULAR; INTRAVENOUS at 09:23

## 2025-05-14 RX ADMIN — MIDAZOLAM 1 MG: 5 INJECTION INTRAMUSCULAR; INTRAVENOUS at 09:22

## 2025-05-14 ASSESSMENT — PAIN - FUNCTIONAL ASSESSMENT: PAIN_FUNCTIONAL_ASSESSMENT: NONE - DENIES PAIN

## 2025-05-14 NOTE — BRIEF OP NOTE
Brief Postoperative Note      Patient: Dougie Crouch  YOB: 1955  MRN: [id number]    Date of Procedure: 5/14/2025  Proteinuria    Post-Op Diagnosis: Same       Ct guided renal biopsy    KATIE Pina    Assistant:  * No surgical staff found *    Anesthesia: * No anesthesia type entered *    Estimated Blood Loss (mL): Minimal    Complications: None    Specimens:   Right renal    Implants:  * No implants in log *      Drains: * No LDAs found *    Findings:  Infection Present At Time Of Surgery (PATOS) (choose all levels that have infection present):  No infection present  Other Findings: renal biopsy    Electronically signed by Alyssa Pina MD on 5/14/2025 at 9:59 AM

## 2025-05-14 NOTE — PROGRESS NOTES
TRANSFER - OUT REPORT:    Verbal report given to [facility] RNs on Dougie Crouch being transferred back to IR holding for routine post-op       Report consisted of patient's Situation, Background, Assessment and   Recommendations(SBAR).     Information from the following report(s) Nurse Handoff Report was reviewed with the receiving nurse.    Opportunity for questions and clarification was provided.      Patient transported with:   Registered Nurse

## 2025-05-14 NOTE — PROGRESS NOTES
Pt okay to discharge.  Biopsy WNL.  Pt ambulated well to bathroom.  Discharge paperwork provided to pt.  Volunteer assisted pt to front lobby to meet pt's sister.

## 2025-05-14 NOTE — PRE SEDATION
Sedation Pre-Procedure Note    Patient Name: Dougie Crouch   YOB: 1955  Room/Bed: Room/bed info not found  Medical Record Number: [id number]  Date: 5/14/2025   Time: 9:57 AM       Indication:  proteinuria    Consent: I have discussed with the patient and/or the patient representative the indication, alternatives, and the possible risks and/or complications of the planned procedure and the anesthesia methods. The patient and/or patient representative appear to understand and agree to proceed.    Vital Signs:   Vitals:    05/14/25 0940   BP: (!) 142/98   Pulse: 92   Resp: 16   SpO2: 98%       Past Medical History:   has a past medical history of Arthritis, Complex sleep apnea syndrome, COPD (chronic obstructive pulmonary disease) (HCC), Diabetes mellitus (HCC), Excessive daytime sleepiness, History of blood transfusion, Hyperlipidemia, Hypertension, and Proteinuria.    Past Surgical History:   has a past surgical history that includes shoulder surgery (Right, 1990's); Tonsillectomy (as a kid); Colonoscopy (2016); Cholecystectomy (2016); Endoscopy, colon, diagnostic (2016); Cardiac catheterization; joint replacement (Right, 2000); and hernia repair.    Medications:   Scheduled Meds:   Continuous Infusions:   PRN Meds:   Home Meds:   Prior to Admission medications    Medication Sig Start Date End Date Taking? Authorizing Provider   oxyCODONE-acetaminophen (PERCOCET) 7.5-325 MG per tablet TAKE 1 TABLET BY MOUTH FOUR TIMES DAILY AS NEEDED FOR CHRONIC PAIN 4/29/25  Yes Provider, MD Lui   Semaglutide,0.25 or 0.5MG/DOS, (OZEMPIC, 0.25 OR 0.5 MG/DOSE,) 2 MG/3ML SOPN Inject 2 mg into the skin every 7 days 5/1/25  Yes Eh Arizmendi MD   furosemide (LASIX) 40 MG tablet Take 1 tablet by mouth 3 times daily 5/1/25  Yes Eh Arizmendi MD   metOLazone (ZAROXOLYN) 2.5 MG tablet Take 1 tablet by mouth daily 5/1/25  Yes Eh Arizmendi MD   Finerenone (KERENDIA) 10 MG TABS Take 10 mg by mouth daily

## 2025-05-17 LAB — SURGICAL PATHOLOGY REPORT: NORMAL

## 2025-05-27 ENCOUNTER — TELEPHONE (OUTPATIENT)
Dept: PULMONOLOGY | Age: 70
End: 2025-05-27

## 2025-05-27 DIAGNOSIS — J43.2 CENTRILOBULAR EMPHYSEMA (HCC): ICD-10-CM

## 2025-05-27 DIAGNOSIS — J44.9 STAGE 4 VERY SEVERE COPD BY GOLD CLASSIFICATION (HCC): ICD-10-CM

## 2025-05-27 DIAGNOSIS — G47.19 EXCESSIVE DAYTIME SLEEPINESS: ICD-10-CM

## 2025-05-27 DIAGNOSIS — G47.39 COMPLEX SLEEP APNEA SYNDROME: Primary | ICD-10-CM

## 2025-05-27 DIAGNOSIS — J96.11 CHRONIC HYPOXEMIC RESPIRATORY FAILURE (HCC): ICD-10-CM

## 2025-05-27 DIAGNOSIS — R06.02 SHORTNESS OF BREATH: ICD-10-CM

## 2025-05-27 DIAGNOSIS — I27.20 PULMONARY HYPERTENSION (HCC): ICD-10-CM

## 2025-05-27 DIAGNOSIS — Z87.891 FORMER SMOKER: ICD-10-CM

## 2025-06-05 ENCOUNTER — TELEPHONE (OUTPATIENT)
Dept: PULMONOLOGY | Age: 70
End: 2025-06-05

## 2025-06-05 DIAGNOSIS — G47.33 OBSTRUCTIVE SLEEP APNEA: Primary | ICD-10-CM

## 2025-06-05 NOTE — TELEPHONE ENCOUNTER
SS scheduled for 6/29    Patient called stating he was given a sleeping pill from you to take on the night of his sleep study, but patient took it last night. He states he went ahead and took it because he had not yet heard from the Sleep lab, and just talked to them today to schedule SS.     Please advise

## 2025-06-06 RX ORDER — ZOLPIDEM TARTRATE 5 MG/1
5 TABLET ORAL NIGHTLY PRN
Qty: 1 TABLET | Refills: 0 | Status: SHIPPED | OUTPATIENT
Start: 2025-06-06 | End: 2025-06-07

## 2025-06-10 ENCOUNTER — OFFICE VISIT (OUTPATIENT)
Dept: PULMONOLOGY | Age: 70
End: 2025-06-10
Payer: MEDICARE

## 2025-06-10 VITALS
OXYGEN SATURATION: 93 % | DIASTOLIC BLOOD PRESSURE: 84 MMHG | HEART RATE: 99 BPM | WEIGHT: 298 LBS | BODY MASS INDEX: 39.49 KG/M2 | SYSTOLIC BLOOD PRESSURE: 136 MMHG | HEIGHT: 73 IN

## 2025-06-10 DIAGNOSIS — G47.39 COMPLEX SLEEP APNEA SYNDROME: ICD-10-CM

## 2025-06-10 DIAGNOSIS — R07.89 ATYPICAL CHEST PAIN: ICD-10-CM

## 2025-06-10 DIAGNOSIS — J44.1 ACUTE EXACERBATION OF CHRONIC OBSTRUCTIVE PULMONARY DISEASE (COPD) (HCC): Primary | ICD-10-CM

## 2025-06-10 DIAGNOSIS — J43.2 CENTRILOBULAR EMPHYSEMA (HCC): ICD-10-CM

## 2025-06-10 DIAGNOSIS — Z87.891 FORMER SMOKER: ICD-10-CM

## 2025-06-10 DIAGNOSIS — R06.02 SHORTNESS OF BREATH: ICD-10-CM

## 2025-06-10 DIAGNOSIS — J96.11 CHRONIC HYPOXEMIC RESPIRATORY FAILURE (HCC): ICD-10-CM

## 2025-06-10 DIAGNOSIS — J92.0 CALCIFIED PLEURAL PLAQUE DUE TO ASBESTOS EXPOSURE: ICD-10-CM

## 2025-06-10 DIAGNOSIS — J44.9 STAGE 4 VERY SEVERE COPD BY GOLD CLASSIFICATION (HCC): ICD-10-CM

## 2025-06-10 PROCEDURE — 1123F ACP DISCUSS/DSCN MKR DOCD: CPT | Performed by: INTERNAL MEDICINE

## 2025-06-10 PROCEDURE — 3017F COLORECTAL CA SCREEN DOC REV: CPT | Performed by: INTERNAL MEDICINE

## 2025-06-10 PROCEDURE — 1036F TOBACCO NON-USER: CPT | Performed by: INTERNAL MEDICINE

## 2025-06-10 PROCEDURE — 3075F SYST BP GE 130 - 139MM HG: CPT | Performed by: INTERNAL MEDICINE

## 2025-06-10 PROCEDURE — 1160F RVW MEDS BY RX/DR IN RCRD: CPT | Performed by: INTERNAL MEDICINE

## 2025-06-10 PROCEDURE — 3023F SPIROM DOC REV: CPT | Performed by: INTERNAL MEDICINE

## 2025-06-10 PROCEDURE — G8417 CALC BMI ABV UP PARAM F/U: HCPCS | Performed by: INTERNAL MEDICINE

## 2025-06-10 PROCEDURE — G8427 DOCREV CUR MEDS BY ELIG CLIN: HCPCS | Performed by: INTERNAL MEDICINE

## 2025-06-10 PROCEDURE — 99213 OFFICE O/P EST LOW 20 MIN: CPT | Performed by: INTERNAL MEDICINE

## 2025-06-10 PROCEDURE — 1159F MED LIST DOCD IN RCRD: CPT | Performed by: INTERNAL MEDICINE

## 2025-06-10 PROCEDURE — 3079F DIAST BP 80-89 MM HG: CPT | Performed by: INTERNAL MEDICINE

## 2025-06-10 RX ORDER — PREDNISONE 5 MG/1
TABLET ORAL
Qty: 38 TABLET | Refills: 0 | Status: SHIPPED | OUTPATIENT
Start: 2025-06-10

## 2025-06-10 RX ORDER — DOXYCYCLINE HYCLATE 100 MG
100 TABLET ORAL 2 TIMES DAILY
Qty: 14 TABLET | Refills: 0 | Status: SHIPPED | OUTPATIENT
Start: 2025-06-10 | End: 2025-06-17

## 2025-06-10 RX ORDER — FLUTICASONE FUROATE, UMECLIDINIUM BROMIDE AND VILANTEROL TRIFENATATE 200; 62.5; 25 UG/1; UG/1; UG/1
1 POWDER RESPIRATORY (INHALATION) DAILY
Qty: 1 EACH | Refills: 11 | Status: SHIPPED | OUTPATIENT
Start: 2025-06-10

## 2025-06-10 NOTE — PROGRESS NOTES
dictated errors within this office note.  Any errors should be brought immediately to my attention for correction.  All efforts were made to ensure that this office note is accurate.

## 2025-06-16 ENCOUNTER — HOSPITAL ENCOUNTER (OUTPATIENT)
Dept: SLEEP CENTER | Age: 70
Discharge: HOME | End: 2025-06-16
Attending: INTERNAL MEDICINE
Payer: MEDICARE

## 2025-06-16 DIAGNOSIS — I27.20 PULMONARY HYPERTENSION (HCC): ICD-10-CM

## 2025-06-16 DIAGNOSIS — G47.39 COMPLEX SLEEP APNEA SYNDROME: ICD-10-CM

## 2025-06-16 DIAGNOSIS — J44.9 STAGE 4 VERY SEVERE COPD BY GOLD CLASSIFICATION (HCC): ICD-10-CM

## 2025-06-16 DIAGNOSIS — J43.2 CENTRILOBULAR EMPHYSEMA (HCC): ICD-10-CM

## 2025-06-16 DIAGNOSIS — G47.19 EXCESSIVE DAYTIME SLEEPINESS: ICD-10-CM

## 2025-06-16 DIAGNOSIS — J96.11 CHRONIC HYPOXEMIC RESPIRATORY FAILURE (HCC): ICD-10-CM

## 2025-06-16 DIAGNOSIS — Z87.891 FORMER SMOKER: ICD-10-CM

## 2025-06-16 DIAGNOSIS — R06.02 SHORTNESS OF BREATH: ICD-10-CM

## 2025-06-16 PROCEDURE — 95810 POLYSOM 6/> YRS 4/> PARAM: CPT

## 2025-06-17 NOTE — PROGRESS NOTES
6/17/2025  sleep study  for Dougie Crouch  1955 is complete.      Results are pending physician review.    Electronically signed by Brittanie Catherine on 6/17/2025 at 4:20 AM

## 2025-06-18 DIAGNOSIS — R94.31 ABNORMAL EKG: Primary | ICD-10-CM

## 2025-07-09 PROBLEM — E83.52 HYPERCALCEMIA: Status: ACTIVE | Noted: 2025-07-09

## 2025-07-22 ENCOUNTER — OFFICE VISIT (OUTPATIENT)
Dept: PULMONOLOGY | Age: 70
End: 2025-07-22
Payer: MEDICARE

## 2025-07-22 VITALS
BODY MASS INDEX: 35.15 KG/M2 | SYSTOLIC BLOOD PRESSURE: 128 MMHG | HEART RATE: 96 BPM | DIASTOLIC BLOOD PRESSURE: 76 MMHG | OXYGEN SATURATION: 96 % | HEIGHT: 73 IN | WEIGHT: 265.2 LBS

## 2025-07-22 DIAGNOSIS — J96.11 CHRONIC HYPOXEMIC RESPIRATORY FAILURE (HCC): ICD-10-CM

## 2025-07-22 DIAGNOSIS — J44.9 STAGE 4 VERY SEVERE COPD BY GOLD CLASSIFICATION (HCC): Primary | ICD-10-CM

## 2025-07-22 DIAGNOSIS — J92.0 CALCIFIED PLEURAL PLAQUE DUE TO ASBESTOS EXPOSURE: ICD-10-CM

## 2025-07-22 DIAGNOSIS — J43.2 CENTRILOBULAR EMPHYSEMA (HCC): ICD-10-CM

## 2025-07-22 DIAGNOSIS — Z92.89 HISTORY OF BILEVEL POSITIVE AIRWAY PRESSURE (BIPAP) THERAPY: ICD-10-CM

## 2025-07-22 DIAGNOSIS — G47.33 MODERATE OBSTRUCTIVE SLEEP APNEA: ICD-10-CM

## 2025-07-22 DIAGNOSIS — R06.02 SHORTNESS OF BREATH: ICD-10-CM

## 2025-07-22 DIAGNOSIS — I27.20 PULMONARY HYPERTENSION (HCC): ICD-10-CM

## 2025-07-22 PROBLEM — G47.39 COMPLEX SLEEP APNEA SYNDROME: Status: RESOLVED | Noted: 2024-05-28 | Resolved: 2025-07-22

## 2025-07-22 PROCEDURE — 3017F COLORECTAL CA SCREEN DOC REV: CPT | Performed by: INTERNAL MEDICINE

## 2025-07-22 PROCEDURE — 3023F SPIROM DOC REV: CPT | Performed by: INTERNAL MEDICINE

## 2025-07-22 PROCEDURE — 99213 OFFICE O/P EST LOW 20 MIN: CPT | Performed by: INTERNAL MEDICINE

## 2025-07-22 PROCEDURE — G8417 CALC BMI ABV UP PARAM F/U: HCPCS | Performed by: INTERNAL MEDICINE

## 2025-07-22 PROCEDURE — 1160F RVW MEDS BY RX/DR IN RCRD: CPT | Performed by: INTERNAL MEDICINE

## 2025-07-22 PROCEDURE — 3074F SYST BP LT 130 MM HG: CPT | Performed by: INTERNAL MEDICINE

## 2025-07-22 PROCEDURE — 1036F TOBACCO NON-USER: CPT | Performed by: INTERNAL MEDICINE

## 2025-07-22 PROCEDURE — 1123F ACP DISCUSS/DSCN MKR DOCD: CPT | Performed by: INTERNAL MEDICINE

## 2025-07-22 PROCEDURE — G8427 DOCREV CUR MEDS BY ELIG CLIN: HCPCS | Performed by: INTERNAL MEDICINE

## 2025-07-22 PROCEDURE — 3078F DIAST BP <80 MM HG: CPT | Performed by: INTERNAL MEDICINE

## 2025-07-22 PROCEDURE — 1159F MED LIST DOCD IN RCRD: CPT | Performed by: INTERNAL MEDICINE

## 2025-07-22 NOTE — PROGRESS NOTES
SUBJECTIVE:  Chief Complaint: Very severe/stage IV COPD, centrilobular emphysema, chronic hypoxemic respiratory failure, moderate obstructive sleep apnea, asbestos related pleural plaques, pulmonary hypertension, shortness of breath, former smoker    Dougie states that he has been feeling fairly well recently.  He did recover from his episode of bronchitis earlier this summer after taking antibiotics and oral steroids.  He does continue on Trelegy 200   1 elation daily and has albuterol MDI or albuterol solution for nebulizer to use as needed.  He continues to wear oxygen at nighttime with BiPAP.  He typically wears BiPAP 3 to 5 hours per night with supplemental oxygen.  He recently underwent a repeat sleep study which demonstrated moderate obstructive sleep apnea.  There was no mention of central sleep apnea.  He has been on BiPAP for quite some time and would like to continue using it and does not want to go back to the sleep clinic for a CPAP titration study.  He also is refusing APAP.  He does mention that he has a CPAP machine to take with him when he goes on trips but would like to continue wearing BiPAP at home.  We do not have a recent compliance study and I did ask him to take his machine back to [facility] to get a compliance download.  His shortness of breath has remained stable.  He does note some swelling in his ankles at times and is on diuretics.  He denies any recent episodes of CHF.  Although he does have home oxygen he states that he rarely uses it during the day.  He does check himself frequently while awake with his oximeter which usually remains 90% or greater on room air.  He is wearing oxygen at nighttime with BiPAP      ROS:  Constitution:  HEENT: Negative for ear, throat pain  Cardiovascular: Negative for chest pain, syncope, edema  Pulmonary: See HPI  Musculoskeletal: Negative for DVT, myalgias, arthralgias    OBJECTIVE:  /76   Pulse 96   Ht 1.854 m (6' 1\")   Wt 120.3 kg (265 lb 3.2 oz)